# Patient Record
Sex: FEMALE | Race: WHITE | Employment: OTHER | URBAN - METROPOLITAN AREA
[De-identification: names, ages, dates, MRNs, and addresses within clinical notes are randomized per-mention and may not be internally consistent; named-entity substitution may affect disease eponyms.]

---

## 2017-04-04 ENCOUNTER — OFFICE VISIT (OUTPATIENT)
Dept: FAMILY MEDICINE CLINIC | Facility: CLINIC | Age: 47
End: 2017-04-04

## 2017-04-04 VITALS
HEIGHT: 64 IN | TEMPERATURE: 99 F | BODY MASS INDEX: 27.49 KG/M2 | OXYGEN SATURATION: 98 % | HEART RATE: 74 BPM | DIASTOLIC BLOOD PRESSURE: 84 MMHG | RESPIRATION RATE: 18 BRPM | WEIGHT: 161 LBS | SYSTOLIC BLOOD PRESSURE: 108 MMHG

## 2017-04-04 DIAGNOSIS — J02.9 SORE THROAT: Primary | ICD-10-CM

## 2017-04-04 DIAGNOSIS — J01.40 ACUTE PANSINUSITIS, RECURRENCE NOT SPECIFIED: ICD-10-CM

## 2017-04-04 PROCEDURE — 99213 OFFICE O/P EST LOW 20 MIN: CPT | Performed by: PHYSICIAN ASSISTANT

## 2017-04-04 PROCEDURE — 87880 STREP A ASSAY W/OPTIC: CPT | Performed by: PHYSICIAN ASSISTANT

## 2017-04-04 RX ORDER — SWAB
2000 SWAB, NON-MEDICATED MISCELLANEOUS DAILY
COMMUNITY
End: 2017-08-31

## 2017-04-04 RX ORDER — AMOXICILLIN AND CLAVULANATE POTASSIUM 875; 125 MG/1; MG/1
1 TABLET, FILM COATED ORAL 2 TIMES DAILY
Qty: 20 TABLET | Refills: 0 | Status: SHIPPED | OUTPATIENT
Start: 2017-04-04 | End: 2017-04-14

## 2017-04-04 NOTE — PATIENT INSTRUCTIONS
Self-Care for Sinusitis     Drinking plenty of water can help sinuses drain. Sinusitis can often be managed with self-care. Self-care can keep sinuses moist and make you feel more comfortable. Remember to follow your doctor's instructions closely.  This Colds, flu, and allergies make it more likely for you to get sinusitis. Do your best to prevent sinusitis by preventing these problems. Do what you can to avoid getting colds and other infections. Stay away from things that cause allergies (allergens).  Yoli Lazar · Stay away from all types of smoke, which dries out sinus linings. This includes tobacco smoke and chemical smoke in workplace settings. · Use saltwater rinses.   Date Last Reviewed: 10/1/2016  © 2182-7978 The [address] Prevention tips include the following:  · Stop smoking or reduce contact with secondhand smoke. Smoke irritates the tender throat lining. · Limit contact with pets and with allergy-causing substances, such as pollen and mold.   · When you’re around someone

## 2017-04-04 NOTE — PROGRESS NOTES
CHIEF COMPLAINT:   Patient presents with:  Sore Throat    HPI:   Agustín Cornejo is a 55year old female who presents for sinus congestion, PND, and sore throat for  4  days. Symptoms have been worsening since onset.  Sinus congestion/pain is described as a p /84 mmHg  Pulse 74  Temp(Src) 98.8 °F (37.1 °C) (Oral)  Resp 18  Ht 64\"  Wt 161 lb  BMI 27.62 kg/m2  SpO2 98%  GENERAL: well developed, well nourished, and in no apparent distress  SKIN: no rashes, no suspicious lesions  HEAD: atraumatic, normocepha Drink fluids  Drinking extra fluids helps thin your mucus. This lets it drain from your sinuses more easily. Have a glass of water every hour or two. A humidifier helps in much the same way. Fluids can also offset the drying effects of certain medicines.  I When traveling on an airplane, use saline nasal spray to keep your sinuses moist. Drink plenty of fluids. You may also want to take a decongestant before you get on the plane. Prevent colds  Do what you can to avoid being exposed to colds and flu.  When p © 5856-4305 The [address] [place] [address], [address] [place]. All rights reserved. This information is not intended as a substitute for professional medical care. Always follow your healthcare professional's instructions.         Self-Ca · Limit contact with pets and with allergy-causing substances, such as pollen and mold. · When you’re around someone with a sore throat or cold, wash your hands often to keep viruses or bacteria from spreading. · Don’t strain your vocal cords.   Call your

## 2017-08-31 ENCOUNTER — LAB ENCOUNTER (OUTPATIENT)
Dept: LAB | Age: 47
End: 2017-08-31
Attending: INTERNAL MEDICINE
Payer: COMMERCIAL

## 2017-08-31 ENCOUNTER — OFFICE VISIT (OUTPATIENT)
Dept: FAMILY MEDICINE CLINIC | Facility: CLINIC | Age: 47
End: 2017-08-31

## 2017-08-31 VITALS
DIASTOLIC BLOOD PRESSURE: 78 MMHG | BODY MASS INDEX: 27.66 KG/M2 | HEIGHT: 64 IN | SYSTOLIC BLOOD PRESSURE: 122 MMHG | RESPIRATION RATE: 16 BRPM | WEIGHT: 162 LBS | TEMPERATURE: 98 F | HEART RATE: 64 BPM

## 2017-08-31 DIAGNOSIS — M54.2 CHRONIC NECK PAIN: ICD-10-CM

## 2017-08-31 DIAGNOSIS — R07.89 CHEST HEAVINESS: ICD-10-CM

## 2017-08-31 DIAGNOSIS — G89.29 CHRONIC NECK PAIN: ICD-10-CM

## 2017-08-31 DIAGNOSIS — Z00.00 LABORATORY EXAMINATION ORDERED AS PART OF A ROUTINE GENERAL MEDICAL EXAMINATION: ICD-10-CM

## 2017-08-31 DIAGNOSIS — R13.10 DYSPHAGIA, UNSPECIFIED TYPE: ICD-10-CM

## 2017-08-31 DIAGNOSIS — E55.9 HYPOVITAMINOSIS D: ICD-10-CM

## 2017-08-31 DIAGNOSIS — Z12.31 ENCOUNTER FOR SCREENING MAMMOGRAM FOR MALIGNANT NEOPLASM OF BREAST: ICD-10-CM

## 2017-08-31 DIAGNOSIS — Z12.4 CERVICAL CANCER SCREENING: ICD-10-CM

## 2017-08-31 DIAGNOSIS — Z00.00 WELLNESS EXAMINATION: Primary | ICD-10-CM

## 2017-08-31 LAB
25-HYDROXYVITAMIN D (TOTAL): 47.6 NG/ML (ref 30–100)
ALBUMIN SERPL-MCNC: 4 G/DL (ref 3.5–4.8)
ALP LIVER SERPL-CCNC: 73 U/L (ref 39–100)
ALT SERPL-CCNC: 46 U/L (ref 14–54)
AST SERPL-CCNC: 30 U/L (ref 15–41)
BASOPHILS # BLD AUTO: 0.03 X10(3) UL (ref 0–0.1)
BASOPHILS NFR BLD AUTO: 0.4 %
BILIRUB SERPL-MCNC: 0.4 MG/DL (ref 0.1–2)
BUN BLD-MCNC: 10 MG/DL (ref 8–20)
CALCIUM BLD-MCNC: 9.5 MG/DL (ref 8.3–10.3)
CHLORIDE: 106 MMOL/L (ref 101–111)
CHOLEST SMN-MCNC: 226 MG/DL (ref ?–200)
CO2: 27 MMOL/L (ref 22–32)
CREAT BLD-MCNC: 0.66 MG/DL (ref 0.55–1.02)
EOSINOPHIL # BLD AUTO: 0.35 X10(3) UL (ref 0–0.3)
EOSINOPHIL NFR BLD AUTO: 5.2 %
ERYTHROCYTE [DISTWIDTH] IN BLOOD BY AUTOMATED COUNT: 13.6 % (ref 11.5–16)
GLUCOSE BLD-MCNC: 78 MG/DL (ref 70–99)
HCT VFR BLD AUTO: 41 % (ref 34–50)
HDLC SERPL-MCNC: 69 MG/DL (ref 45–?)
HDLC SERPL: 3.28 {RATIO} (ref ?–4.44)
HGB BLD-MCNC: 12.9 G/DL (ref 12–16)
IMMATURE GRANULOCYTE COUNT: 0.02 X10(3) UL (ref 0–1)
IMMATURE GRANULOCYTE RATIO %: 0.3 %
LDLC SERPL CALC-MCNC: 134 MG/DL (ref ?–130)
LDLC SERPL-MCNC: 23 MG/DL (ref 5–40)
LYMPHOCYTES # BLD AUTO: 1.59 X10(3) UL (ref 0.9–4)
LYMPHOCYTES NFR BLD AUTO: 23.8 %
M PROTEIN MFR SERPL ELPH: 7.9 G/DL (ref 6.1–8.3)
MCH RBC QN AUTO: 32.5 PG (ref 27–33.2)
MCHC RBC AUTO-ENTMCNC: 31.5 G/DL (ref 31–37)
MCV RBC AUTO: 103.3 FL (ref 81–100)
MONOCYTES # BLD AUTO: 0.49 X10(3) UL (ref 0.1–0.6)
MONOCYTES NFR BLD AUTO: 7.3 %
NEUTROPHIL ABS PRELIM: 4.2 X10 (3) UL (ref 1.3–6.7)
NEUTROPHILS # BLD AUTO: 4.2 X10(3) UL (ref 1.3–6.7)
NEUTROPHILS NFR BLD AUTO: 63 %
NONHDLC SERPL-MCNC: 157 MG/DL (ref ?–130)
PLATELET # BLD AUTO: 219 10(3)UL (ref 150–450)
POTASSIUM SERPL-SCNC: 4.9 MMOL/L (ref 3.6–5.1)
RBC # BLD AUTO: 3.97 X10(6)UL (ref 3.8–5.1)
RED CELL DISTRIBUTION WIDTH-SD: 51.8 FL (ref 35.1–46.3)
SODIUM SERPL-SCNC: 141 MMOL/L (ref 136–144)
TRIGLYCERIDES: 115 MG/DL (ref ?–150)
TSI SER-ACNC: 1.64 MIU/ML (ref 0.35–5.5)
WBC # BLD AUTO: 6.7 X10(3) UL (ref 4–13)

## 2017-08-31 PROCEDURE — 82306 VITAMIN D 25 HYDROXY: CPT | Performed by: INTERNAL MEDICINE

## 2017-08-31 PROCEDURE — 99396 PREV VISIT EST AGE 40-64: CPT | Performed by: INTERNAL MEDICINE

## 2017-08-31 PROCEDURE — 93000 ELECTROCARDIOGRAM COMPLETE: CPT | Performed by: INTERNAL MEDICINE

## 2017-08-31 PROCEDURE — 80061 LIPID PANEL: CPT | Performed by: INTERNAL MEDICINE

## 2017-08-31 PROCEDURE — 80050 GENERAL HEALTH PANEL: CPT | Performed by: INTERNAL MEDICINE

## 2017-08-31 PROCEDURE — 36415 COLL VENOUS BLD VENIPUNCTURE: CPT | Performed by: INTERNAL MEDICINE

## 2017-08-31 RX ORDER — MULTIVIT-MIN/IRON/FOLIC ACID/K 18-600-40
1 CAPSULE ORAL DAILY
COMMUNITY

## 2017-08-31 RX ORDER — MULTIVITAMIN WITH IRON
1 TABLET ORAL DAILY
COMMUNITY
Start: 2015-07-01 | End: 2021-02-02 | Stop reason: ALTCHOICE

## 2017-08-31 RX ORDER — FLUOXETINE HYDROCHLORIDE 20 MG/1
20 CAPSULE ORAL DAILY
Qty: 90 CAPSULE | Refills: 3 | Status: SHIPPED | OUTPATIENT
Start: 2017-08-31 | End: 2018-08-03

## 2017-08-31 NOTE — PATIENT INSTRUCTIONS
Thank you for choosing Ced Calvert MD at [place] [address]  To Do: Gabby Meeks  1. Blood work  2. Call gastroenterology to schedule appointment  3. Call to schedule barium esophagram (schedule to be done prior to gastroenterology appointment)  4.  Ca entire package insert of all medicines prescribed to you and be aware of all of the risks of treatment even beyond those discussed today.  All therapies have potential risk of harm or side effects or medication interactions.  It is your duty and for your s

## 2017-08-31 NOTE — PROGRESS NOTES
[facility] Internal Medicine Office Note  Chief Complaint:   Patient presents with:  Wellness Visit      HPI:   This is a 52year old female coming in for physical   HPI    HM: due for pap - requests new gynecologist   mammo - due    Anxiety - re Comment:Has tolerated Zpack  Macrobid [Nitrofura*      [facility] R*        Current Outpatient Prescriptions:  magnesium 250 MG Oral Tab Take 1 tablet by mouth daily.  Disp:  Rfl:    Cholecalciferol (VITAMIN D) 2000 units Oral Cap Take 1 capsule She has no cervical adenopathy. Neurological: She is alert. Skin: No rash noted. No erythema. Psychiatric: She has a normal mood and affect.        ASSESSMENT AND PLAN:   Debbie Cash is a 52year old female with  Wellness examination  (primary encou Vitamin D, 25-Hydroxy [E]    Meds & Refills for this Visit:  Signed Prescriptions Disp Refills    FLUoxetine HCl 20 MG Oral Cap 90 capsule 3      Sig: Take 1 capsule (20 mg total) by mouth daily.            Imaging & Consults:  OBG - INTERNAL  GASTRO - IN

## 2017-09-01 ENCOUNTER — TELEPHONE (OUTPATIENT)
Dept: FAMILY MEDICINE CLINIC | Facility: CLINIC | Age: 47
End: 2017-09-01

## 2017-09-01 DIAGNOSIS — M54.2 CHRONIC NECK PAIN: Primary | ICD-10-CM

## 2017-09-01 DIAGNOSIS — G89.29 CHRONIC NECK PAIN: Primary | ICD-10-CM

## 2017-09-07 ENCOUNTER — HOSPITAL ENCOUNTER (OUTPATIENT)
Dept: MAMMOGRAPHY | Age: 47
Discharge: HOME | End: 2017-09-07
Attending: INTERNAL MEDICINE
Payer: COMMERCIAL

## 2017-09-07 ENCOUNTER — HOSPITAL ENCOUNTER (OUTPATIENT)
Dept: GENERAL RADIOLOGY | Age: 47
Discharge: HOME | End: 2017-09-07
Attending: INTERNAL MEDICINE
Payer: COMMERCIAL

## 2017-09-07 DIAGNOSIS — Z12.31 ENCOUNTER FOR SCREENING MAMMOGRAM FOR MALIGNANT NEOPLASM OF BREAST: ICD-10-CM

## 2017-09-07 DIAGNOSIS — R13.10 DYSPHAGIA, UNSPECIFIED TYPE: ICD-10-CM

## 2017-09-07 PROCEDURE — 74246 X-RAY XM UPR GI TRC 2CNTRST: CPT | Performed by: INTERNAL MEDICINE

## 2017-09-07 PROCEDURE — 77067 SCR MAMMO BI INCL CAD: CPT | Performed by: INTERNAL MEDICINE

## 2017-09-18 ENCOUNTER — TELEPHONE (OUTPATIENT)
Dept: FAMILY MEDICINE CLINIC | Facility: CLINIC | Age: 47
End: 2017-09-18

## 2017-10-09 ENCOUNTER — OFFICE VISIT (OUTPATIENT)
Dept: OBGYN CLINIC | Facility: CLINIC | Age: 47
End: 2017-10-09

## 2017-10-09 VITALS
BODY MASS INDEX: 28 KG/M2 | HEART RATE: 80 BPM | SYSTOLIC BLOOD PRESSURE: 118 MMHG | WEIGHT: 164 LBS | DIASTOLIC BLOOD PRESSURE: 70 MMHG | HEIGHT: 64 IN

## 2017-10-09 DIAGNOSIS — Z12.39 BREAST CANCER SCREENING: ICD-10-CM

## 2017-10-09 DIAGNOSIS — Z01.419 WELL WOMAN EXAM: Primary | ICD-10-CM

## 2017-10-09 DIAGNOSIS — Z12.4 CERVICAL CANCER SCREENING: ICD-10-CM

## 2017-10-09 PROCEDURE — 87624 HPV HI-RISK TYP POOLED RSLT: CPT | Performed by: OBSTETRICS & GYNECOLOGY

## 2017-10-09 PROCEDURE — 99386 PREV VISIT NEW AGE 40-64: CPT | Performed by: OBSTETRICS & GYNECOLOGY

## 2017-10-09 PROCEDURE — 88175 CYTOPATH C/V AUTO FLUID REDO: CPT | Performed by: OBSTETRICS & GYNECOLOGY

## 2017-10-09 RX ORDER — MELATONIN
1000 DAILY
COMMUNITY
Start: 2017-09-01 | End: 2019-10-22 | Stop reason: ALTCHOICE

## 2017-10-09 NOTE — PROGRESS NOTES
GYN H&P     10/9/2017  2:08 PM    CC: Patient presents with:  Physical: PT here establish care and annual       HPI: patient is a 52year old  here for her annual gyne exam.      No menses for one year. Pt having hot flashes.    no pelvic pain  no va Watson Encarnacion Father    • Thyroid Disorder Mother    • Clotting Disorder Paternal Grandmother       of blood clot after orthopedic surgery, was elderly   • Clotting Disorder Paternal Aunt      DVT       Social History  Social History   Marital status: Trigg Nipper nipple discharge, no skin changes, no axillary adenopathy  ABDOMEN: Soft, non distended; non tender, no masses  GYNE/:                        External Genitalia: Normal appearing, no lesions.            Bladder: well supported, urethra wnl, no lesions

## 2017-11-29 ENCOUNTER — APPOINTMENT (OUTPATIENT)
Dept: LAB | Age: 47
End: 2017-11-29
Attending: INTERNAL MEDICINE
Payer: COMMERCIAL

## 2017-11-29 DIAGNOSIS — A04.8 H. PYLORI INFECTION: ICD-10-CM

## 2017-11-29 PROCEDURE — 83013 H PYLORI (C-13) BREATH: CPT

## 2017-12-24 ENCOUNTER — HOSPITAL ENCOUNTER (EMERGENCY)
Age: 47
Discharge: HOME | End: 2017-12-24
Attending: EMERGENCY MEDICINE
Payer: COMMERCIAL

## 2017-12-24 ENCOUNTER — APPOINTMENT (OUTPATIENT)
Dept: CT IMAGING | Age: 47
End: 2017-12-24
Attending: PHYSICIAN ASSISTANT
Payer: COMMERCIAL

## 2017-12-24 VITALS
HEIGHT: 64 IN | DIASTOLIC BLOOD PRESSURE: 96 MMHG | WEIGHT: 158 LBS | OXYGEN SATURATION: 100 % | RESPIRATION RATE: 16 BRPM | SYSTOLIC BLOOD PRESSURE: 133 MMHG | TEMPERATURE: 98 F | HEART RATE: 71 BPM | BODY MASS INDEX: 26.98 KG/M2
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
## 2017-12-24 DIAGNOSIS — S02.92XA MULTIPLE CLOSED FRACTURES OF FACIAL BONE, INITIAL ENCOUNTER (HCC): ICD-10-CM

## 2017-12-24 PROCEDURE — 99285 EMERGENCY DEPT VISIT HI MDM: CPT

## 2017-12-24 PROCEDURE — 99284 EMERGENCY DEPT VISIT MOD MDM: CPT

## 2017-12-24 PROCEDURE — 70486 CT MAXILLOFACIAL W/O DYE: CPT | Performed by: PHYSICIAN ASSISTANT

## 2017-12-24 NOTE — ED INITIAL ASSESSMENT (HPI)
Rito Ice onto face while dancing 2 days ago - denies loc - c/o increased swelling to face also c/o pain to right forearm

## 2017-12-24 NOTE — ED PROVIDER NOTES
I reviewed that chart and discussed the case. I have examined the patient and noted patient was dancing and fell after tripping. She fell onto her right side of her face. No loss of consciousness.   The patient states the pain is still present no double arch, anterior and posterior lateral maxillary sinus walls, with trace blood in the right maxillary sinus. There is also a nondepressed right lateral orbital wall fracture.    Dictated by: Radha Srivastava MD on 12/24/2017 at 13:17     Approved by: Claudetta Salm

## 2017-12-24 NOTE — ED PROVIDER NOTES
Patient Seen in: THE [facility] In [place]    History   Patient presents with:  Pain (neurologic)    Stated Complaint: fell 2days ago - pain to face     HPI    CHIEF COMPLAINT: Right-sided facial pain and swelling     HISTORY OF PRESENT I Never Used                      Alcohol use: Yes              Comment: once a week      Review of Systems    Positive for stated complaint: fell 2days ago - pain to face   Other systems are as noted in HPI. Constitutional and vital signs reviewed.       Al motion  Psychiatric: calm and cooperative  Right upper extremity:     ED Course   Labs Reviewed - No data to display    Ct Facial Bones (cpt=70486)    Result Date: 12/24/2017  PROCEDURE:  CT FACIAL BONES (CPT=70486)  COMPARISON:  [place], CT ANGIOGRAPHY of ENT. He recommends the patient follow up with him in office in 2 weeks. Patient does not need an antibiotic at this time. She can take Tylenol or ibuprofen as needed for pain.     MDM     70-year-old female with multiple facial bone fractures that she

## 2018-02-19 ENCOUNTER — HOSPITAL ENCOUNTER (OUTPATIENT)
Dept: GENERAL RADIOLOGY | Age: 48
Discharge: HOME | End: 2018-02-19
Attending: INTERNAL MEDICINE
Payer: COMMERCIAL

## 2018-02-19 DIAGNOSIS — M54.2 NECK PAIN: ICD-10-CM

## 2018-02-19 PROCEDURE — 72040 X-RAY EXAM NECK SPINE 2-3 VW: CPT | Performed by: INTERNAL MEDICINE

## 2018-05-08 ENCOUNTER — OFFICE VISIT (OUTPATIENT)
Dept: FAMILY MEDICINE CLINIC | Facility: CLINIC | Age: 48
End: 2018-05-08

## 2018-05-08 VITALS
SYSTOLIC BLOOD PRESSURE: 108 MMHG | DIASTOLIC BLOOD PRESSURE: 60 MMHG | TEMPERATURE: 99 F | OXYGEN SATURATION: 99 % | WEIGHT: 164 LBS | BODY MASS INDEX: 28 KG/M2 | HEART RATE: 63 BPM | RESPIRATION RATE: 18 BRPM

## 2018-05-08 DIAGNOSIS — J02.9 SORE THROAT: Primary | ICD-10-CM

## 2018-05-08 DIAGNOSIS — H69.82 EUSTACHIAN TUBE DYSFUNCTION, LEFT: ICD-10-CM

## 2018-05-08 PROCEDURE — 99213 OFFICE O/P EST LOW 20 MIN: CPT | Performed by: NURSE PRACTITIONER

## 2018-05-08 PROCEDURE — 87880 STREP A ASSAY W/OPTIC: CPT | Performed by: NURSE PRACTITIONER

## 2018-05-08 NOTE — PROGRESS NOTES
CHIEF COMPLAINT:   Patient presents with:  Ear Pain: pt c\o of left ear pain, x 9 day       HPI:   Kyle Vogel is a 52year old female who presents to clinic today with complaints of L ear pain. Has had for 9  days.  Denies drainage from ear and itchi REVIEW OF SYSTEMS:   GENERAL: Feeling well otherwise. SKIN: no unusual skin lesions or rashes  HEENT: See HPI  LUNGS: No cough, shortness of breath, or wheezing. CARDIOVASCULAR: No chest pain, palpitations  GI: No N/V/C/D. No abdominal pain.   NEURO: de No prescriptions requested or ordered in this encounter      Risk and benefits of medication discussed. Stressed importance of completing full course of antibiotic. Tylenol/Motrin prn pain.       Call or return if s/sx worsen, do not improve in 3 days, Because the middle ear fluid can become infected, it is important to watch for signs of an ear infection which may develop later. These signs include increased ear pain, fever, or drainage from the ear.   Home care  The following guidelines will help you ca You have been diagnosed with temporomandibular disorder (TMD). This term describes a group of problems related to the temporomandibular joint (TMJ) and nearby muscles. The TMJ is located where the upper and lower jaws meet.  TMD can cause painful and frustr · Massage, both inside and outside the mouth. This relaxes muscles and improves circulation. · Palpation, which is applying pressure to points of the jaw and face with the fingers. · Cold spray and stretching of the muscles to relax them.   · An anestheti Patient voiced understand and is in agreement with treatment plan.

## 2018-05-08 NOTE — PATIENT INSTRUCTIONS
-take allegra daily  -take ibuprofen 600mg every 6-8 hours for the next couple of days  -follow up with dentistry if continued jaw pain      Earache, No Infection (Adult)  Earaches can happen without an infection.  This occurs when air and fluid build up be · You may use over-the-counter medicine as directed to control pain, unless another medicine was prescribed. If you have chronic liver or kidney disease or ever had a stomach ulcer or GI bleeding, talk with your doctor before using these medicines.  Aspirin You have been diagnosed with temporomandibular disorder (TMD). This term describes a group of problems related to the temporomandibular joint (TMJ) and nearby muscles. The TMJ is located where the upper and lower jaws meet.  TMD can cause painful and frustr · Massage, both inside and outside the mouth. This relaxes muscles and improves circulation. · Palpation, which is applying pressure to points of the jaw and face with the fingers. · Cold spray and stretching of the muscles to relax them.   · An anestheti

## 2018-08-03 RX ORDER — FLUOXETINE HYDROCHLORIDE 20 MG/1
CAPSULE ORAL
Qty: 90 CAPSULE | OUTPATIENT
Start: 2018-08-03

## 2018-08-03 RX ORDER — FLUOXETINE HYDROCHLORIDE 20 MG/1
20 CAPSULE ORAL DAILY
Qty: 90 CAPSULE | Refills: 0 | Status: SHIPPED | OUTPATIENT
Start: 2018-08-03 | End: 2018-10-11

## 2018-10-11 ENCOUNTER — HOSPITAL ENCOUNTER (OUTPATIENT)
Dept: MAMMOGRAPHY | Age: 48
Discharge: HOME | End: 2018-10-11
Attending: INTERNAL MEDICINE
Payer: COMMERCIAL

## 2018-10-11 ENCOUNTER — OFFICE VISIT (OUTPATIENT)
Dept: INTERNAL MEDICINE CLINIC | Facility: CLINIC | Age: 48
End: 2018-10-11
Payer: COMMERCIAL

## 2018-10-11 ENCOUNTER — LAB ENCOUNTER (OUTPATIENT)
Dept: LAB | Age: 48
End: 2018-10-11
Attending: INTERNAL MEDICINE
Payer: COMMERCIAL

## 2018-10-11 VITALS
BODY MASS INDEX: 27.83 KG/M2 | HEART RATE: 74 BPM | DIASTOLIC BLOOD PRESSURE: 80 MMHG | TEMPERATURE: 99 F | WEIGHT: 163 LBS | OXYGEN SATURATION: 98 % | SYSTOLIC BLOOD PRESSURE: 122 MMHG | HEIGHT: 64 IN | RESPIRATION RATE: 14 BRPM

## 2018-10-11 DIAGNOSIS — E55.9 HYPOVITAMINOSIS D: ICD-10-CM

## 2018-10-11 DIAGNOSIS — F41.9 ANXIETY: ICD-10-CM

## 2018-10-11 DIAGNOSIS — Z00.00 WELLNESS EXAMINATION: Primary | ICD-10-CM

## 2018-10-11 DIAGNOSIS — R53.83 FATIGUE, UNSPECIFIED TYPE: ICD-10-CM

## 2018-10-11 DIAGNOSIS — E53.8 LOW VITAMIN B12 LEVEL: ICD-10-CM

## 2018-10-11 DIAGNOSIS — Z12.39 SCREENING FOR BREAST CANCER: ICD-10-CM

## 2018-10-11 DIAGNOSIS — Z12.31 SCREENING MAMMOGRAM, ENCOUNTER FOR: ICD-10-CM

## 2018-10-11 DIAGNOSIS — L02.91 ABSCESS: ICD-10-CM

## 2018-10-11 DIAGNOSIS — Z00.00 LABORATORY EXAM ORDERED AS PART OF ROUTINE GENERAL MEDICAL EXAMINATION: ICD-10-CM

## 2018-10-11 PROCEDURE — 84439 ASSAY OF FREE THYROXINE: CPT | Performed by: INTERNAL MEDICINE

## 2018-10-11 PROCEDURE — 99396 PREV VISIT EST AGE 40-64: CPT | Performed by: INTERNAL MEDICINE

## 2018-10-11 PROCEDURE — 90686 IIV4 VACC NO PRSV 0.5 ML IM: CPT | Performed by: INTERNAL MEDICINE

## 2018-10-11 PROCEDURE — 77063 BREAST TOMOSYNTHESIS BI: CPT | Performed by: INTERNAL MEDICINE

## 2018-10-11 PROCEDURE — 80050 GENERAL HEALTH PANEL: CPT | Performed by: INTERNAL MEDICINE

## 2018-10-11 PROCEDURE — 36415 COLL VENOUS BLD VENIPUNCTURE: CPT | Performed by: INTERNAL MEDICINE

## 2018-10-11 PROCEDURE — 77067 SCR MAMMO BI INCL CAD: CPT | Performed by: INTERNAL MEDICINE

## 2018-10-11 PROCEDURE — 80061 LIPID PANEL: CPT | Performed by: INTERNAL MEDICINE

## 2018-10-11 PROCEDURE — 90471 IMMUNIZATION ADMIN: CPT | Performed by: INTERNAL MEDICINE

## 2018-10-11 PROCEDURE — 82306 VITAMIN D 25 HYDROXY: CPT | Performed by: INTERNAL MEDICINE

## 2018-10-11 PROCEDURE — 82607 VITAMIN B-12: CPT | Performed by: INTERNAL MEDICINE

## 2018-10-11 RX ORDER — CYCLOBENZAPRINE HCL 10 MG
TABLET ORAL
COMMUNITY
Start: 2018-06-18

## 2018-10-11 RX ORDER — CEPHALEXIN 500 MG/1
500 CAPSULE ORAL 4 TIMES DAILY
Qty: 28 CAPSULE | Refills: 0 | Status: SHIPPED | OUTPATIENT
Start: 2018-10-11 | End: 2019-10-22 | Stop reason: ALTCHOICE

## 2018-10-11 RX ORDER — FLUOXETINE HYDROCHLORIDE 20 MG/1
20 CAPSULE ORAL DAILY
Qty: 90 CAPSULE | Refills: 3 | Status: SHIPPED | OUTPATIENT
Start: 2018-10-11 | End: 2018-12-22

## 2018-10-11 NOTE — PROGRESS NOTES
[facility] Internal Medicine Office Note  Chief Complaint:   Patient presents with:  CPX      HPI:   This is a 50year old female coming in for physical   HPI  Since last year, she had a fall and fractured her face and still has some numbness in R*        Current Outpatient Medications:  Cyclobenzaprine HCl 10 MG Oral Tab Take by mouth. Disp:  Rfl:    FLUoxetine HCl 20 MG Oral Cap Take 1 capsule (20 mg total) by mouth daily.  Disp: 90 capsule Rfl: 3   cephALEXin 500 MG Oral Cap Take 1 capsule (500 Normal rate, regular rhythm and normal heart sounds. Pulmonary/Chest: Effort normal and breath sounds normal.   Abdominal: Soft. There is no tenderness. Lymphadenopathy:     She has no cervical adenopathy. Neurological: She is alert.    Skin:   Has 2 MG Oral Cap; Take 1 capsule (500 mg total) by mouth 4 (four) times daily.         Orders Placed This Encounter      Vitamin D, 25-Hydroxy [E]      TSH and Free T4 [E]      CBC W Differential W Platelet [E]      Comp Metabolic Panel (14) [E]      Lipid Panel

## 2018-10-12 ENCOUNTER — TELEPHONE (OUTPATIENT)
Dept: INTERNAL MEDICINE CLINIC | Facility: CLINIC | Age: 48
End: 2018-10-12

## 2018-10-12 DIAGNOSIS — E78.5 DYSLIPIDEMIA: ICD-10-CM

## 2018-10-12 DIAGNOSIS — R79.89 LOW SERUM T4 LEVEL: Primary | ICD-10-CM

## 2018-10-12 RX ORDER — LEVOTHYROXINE SODIUM 0.03 MG/1
TABLET ORAL
Qty: 30 TABLET | Refills: 3 | Status: SHIPPED | OUTPATIENT
Start: 2018-10-12 | End: 2018-12-10

## 2018-10-12 NOTE — TELEPHONE ENCOUNTER
Per Dr. Rubin Channel request OV with Dr. Sylwia Bustillos cancelled d/t abscess spontaneously opening and nearly resolved. Cancelled OV with Dr. Sylwia Bustillos.

## 2018-11-29 ENCOUNTER — PATIENT MESSAGE (OUTPATIENT)
Dept: INTERNAL MEDICINE CLINIC | Facility: CLINIC | Age: 48
End: 2018-11-29

## 2018-12-10 NOTE — TELEPHONE ENCOUNTER
Last office visit 10/11/18 with Dr. Josh Silverman. Last refill 10/12/18 (#30, 3 refills). Requesting 90-day supply.

## 2018-12-13 RX ORDER — LEVOTHYROXINE SODIUM 0.03 MG/1
TABLET ORAL
Qty: 90 TABLET | Refills: 0 | Status: SHIPPED | OUTPATIENT
Start: 2018-12-13 | End: 2018-12-22

## 2018-12-17 ENCOUNTER — APPOINTMENT (OUTPATIENT)
Dept: LAB | Age: 48
End: 2018-12-17
Attending: INTERNAL MEDICINE
Payer: COMMERCIAL

## 2018-12-17 DIAGNOSIS — R79.89 LOW SERUM T4 LEVEL: ICD-10-CM

## 2018-12-17 DIAGNOSIS — E78.5 DYSLIPIDEMIA: ICD-10-CM

## 2018-12-17 PROCEDURE — 36415 COLL VENOUS BLD VENIPUNCTURE: CPT | Performed by: INTERNAL MEDICINE

## 2018-12-17 PROCEDURE — 80061 LIPID PANEL: CPT | Performed by: INTERNAL MEDICINE

## 2018-12-17 PROCEDURE — 84443 ASSAY THYROID STIM HORMONE: CPT | Performed by: INTERNAL MEDICINE

## 2018-12-17 PROCEDURE — 84439 ASSAY OF FREE THYROXINE: CPT | Performed by: INTERNAL MEDICINE

## 2018-12-22 DIAGNOSIS — F41.9 ANXIETY: ICD-10-CM

## 2018-12-28 RX ORDER — FLUOXETINE HYDROCHLORIDE 20 MG/1
20 CAPSULE ORAL DAILY
Qty: 90 CAPSULE | Refills: 1 | Status: SHIPPED | OUTPATIENT
Start: 2018-12-28 | End: 2019-06-03

## 2018-12-28 RX ORDER — LEVOTHYROXINE SODIUM 0.03 MG/1
TABLET ORAL
Qty: 90 TABLET | Refills: 1 | Status: SHIPPED | OUTPATIENT
Start: 2018-12-28 | End: 2019-03-21

## 2019-03-11 RX ORDER — LEVOTHYROXINE SODIUM 0.03 MG/1
TABLET ORAL
Qty: 90 TABLET | Refills: 0 | Status: SHIPPED | OUTPATIENT
Start: 2019-03-11 | End: 2019-11-03

## 2019-03-11 NOTE — TELEPHONE ENCOUNTER
Refill requested:   Requested Prescriptions     Pending Prescriptions Disp Refills   • Levothyroxine Sodium 25 MCG Oral Tab [Pharmacy Med Name: LEVOTHYROXINE 25 MCG TABLET] 90 tablet 0     Sig: TAKE 1 TABLET BY MOUTH ONE HOUR PRIOR TO BREAKFAST AND OTHER M

## 2019-03-15 RX ORDER — LEVOTHYROXINE SODIUM 0.03 MG/1
TABLET ORAL
Qty: 90 TABLET | Refills: 0 | OUTPATIENT
Start: 2019-03-15

## 2019-03-18 NOTE — TELEPHONE ENCOUNTER
Patient had medication authorized for: Levothyroxine Sodium 25 MCG Oral Tab    Needs 90 say supply to be sent to [facility] [address]  [address] [place] [phone], [phone]

## 2019-03-21 RX ORDER — LEVOTHYROXINE SODIUM 0.03 MG/1
TABLET ORAL
Qty: 90 TABLET | Refills: 0 | Status: SHIPPED | OUTPATIENT
Start: 2019-03-21 | End: 2019-06-24

## 2019-06-03 DIAGNOSIS — F41.9 ANXIETY: ICD-10-CM

## 2019-06-04 RX ORDER — FLUOXETINE HYDROCHLORIDE 20 MG/1
CAPSULE ORAL
Qty: 90 CAPSULE | Refills: 0 | Status: SHIPPED | OUTPATIENT
Start: 2019-06-04 | End: 2019-08-14

## 2019-06-04 NOTE — TELEPHONE ENCOUNTER
Refill requested:   Requested Prescriptions     Pending Prescriptions Disp Refills   • FLUOXETINE HCL 20 MG Oral Cap [Pharmacy Med Name: FLUOXETINE  20MG  CAP] 90 capsule 1     Sig: TAKE 1 CAPSULE BY MOUTH  DAILY     Non protocol medication      Last refil

## 2019-06-24 DIAGNOSIS — R79.89 ABNORMAL THYROID BLOOD TEST: Primary | ICD-10-CM

## 2019-06-27 RX ORDER — LEVOTHYROXINE SODIUM 0.03 MG/1
TABLET ORAL
Qty: 90 TABLET | Refills: 0 | Status: SHIPPED | OUTPATIENT
Start: 2019-06-27 | End: 2019-09-06

## 2019-07-03 ENCOUNTER — PATIENT MESSAGE (OUTPATIENT)
Dept: INTERNAL MEDICINE CLINIC | Facility: CLINIC | Age: 49
End: 2019-07-03

## 2019-07-03 NOTE — TELEPHONE ENCOUNTER
From: Chi Young  To: Priscila Cole MD  Sent: 7/3/2019 2:34 PM CDT  Subject: Other    Dear Dr. Tika Ratliff,  I have been summoned for [employer] duty in [facility]. I recently sent them a medical history of why I felt I cannot sit on a jury.  They

## 2019-07-22 ENCOUNTER — LAB ENCOUNTER (OUTPATIENT)
Dept: LAB | Age: 49
End: 2019-07-22
Attending: INTERNAL MEDICINE
Payer: COMMERCIAL

## 2019-07-22 DIAGNOSIS — Z51.81 THERAPEUTIC DRUG MONITORING: ICD-10-CM

## 2019-07-22 DIAGNOSIS — R79.89 ABNORMAL THYROID BLOOD TEST: ICD-10-CM

## 2019-07-22 DIAGNOSIS — R53.82 CHRONIC FATIGUE: Primary | ICD-10-CM

## 2019-07-22 LAB
ALBUMIN SERPL-MCNC: 3.8 G/DL (ref 3.4–5)
ALBUMIN/GLOB SERPL: 1 {RATIO} (ref 1–2)
ALP LIVER SERPL-CCNC: 83 U/L (ref 39–100)
ALT SERPL-CCNC: 40 U/L (ref 13–56)
ANION GAP SERPL CALC-SCNC: 6 MMOL/L (ref 0–18)
AST SERPL-CCNC: 20 U/L (ref 15–37)
BASOPHILS # BLD AUTO: 0.03 X10(3) UL (ref 0–0.2)
BASOPHILS NFR BLD AUTO: 0.5 %
BILIRUB SERPL-MCNC: 0.4 MG/DL (ref 0.1–2)
BUN BLD-MCNC: 11 MG/DL (ref 7–18)
BUN/CREAT SERPL: 12.6 (ref 10–20)
CALCIUM BLD-MCNC: 9.1 MG/DL (ref 8.5–10.1)
CHLORIDE SERPL-SCNC: 108 MMOL/L (ref 98–112)
CO2 SERPL-SCNC: 27 MMOL/L (ref 21–32)
CREAT BLD-MCNC: 0.87 MG/DL (ref 0.55–1.02)
DEPRECATED RDW RBC AUTO: 49.7 FL (ref 35.1–46.3)
EOSINOPHIL # BLD AUTO: 0.2 X10(3) UL (ref 0–0.7)
EOSINOPHIL NFR BLD AUTO: 3.4 %
ERYTHROCYTE [DISTWIDTH] IN BLOOD BY AUTOMATED COUNT: 13.3 % (ref 11–15)
GLOBULIN PLAS-MCNC: 3.7 G/DL (ref 2.8–4.4)
GLUCOSE BLD-MCNC: 86 MG/DL (ref 70–99)
HCT VFR BLD AUTO: 40.4 % (ref 35–48)
HGB BLD-MCNC: 12.9 G/DL (ref 12–16)
IMM GRANULOCYTES # BLD AUTO: 0.01 X10(3) UL (ref 0–1)
IMM GRANULOCYTES NFR BLD: 0.2 %
LYMPHOCYTES # BLD AUTO: 1.83 X10(3) UL (ref 1–4)
LYMPHOCYTES NFR BLD AUTO: 30.7 %
M PROTEIN MFR SERPL ELPH: 7.5 G/DL (ref 6.4–8.2)
MCH RBC QN AUTO: 32.4 PG (ref 26–34)
MCHC RBC AUTO-ENTMCNC: 31.9 G/DL (ref 31–37)
MCV RBC AUTO: 101.5 FL (ref 80–100)
MONOCYTES # BLD AUTO: 0.49 X10(3) UL (ref 0.1–1)
MONOCYTES NFR BLD AUTO: 8.2 %
NEUTROPHILS # BLD AUTO: 3.41 X10 (3) UL (ref 1.5–7.7)
NEUTROPHILS # BLD AUTO: 3.41 X10(3) UL (ref 1.5–7.7)
NEUTROPHILS NFR BLD AUTO: 57 %
OSMOLALITY SERPL CALC.SUM OF ELEC: 291 MOSM/KG (ref 275–295)
PLATELET # BLD AUTO: 252 10(3)UL (ref 150–450)
POTASSIUM SERPL-SCNC: 4.2 MMOL/L (ref 3.5–5.1)
RBC # BLD AUTO: 3.98 X10(6)UL (ref 3.8–5.3)
SODIUM SERPL-SCNC: 141 MMOL/L (ref 136–145)
T4 FREE SERPL-MCNC: 0.8 NG/DL (ref 0.8–1.7)
TSI SER-ACNC: 0.88 MIU/ML (ref 0.36–3.74)
WBC # BLD AUTO: 6 X10(3) UL (ref 4–11)

## 2019-07-22 PROCEDURE — 84439 ASSAY OF FREE THYROXINE: CPT | Performed by: INTERNAL MEDICINE

## 2019-07-22 PROCEDURE — 36415 COLL VENOUS BLD VENIPUNCTURE: CPT | Performed by: INTERNAL MEDICINE

## 2019-07-22 PROCEDURE — 84443 ASSAY THYROID STIM HORMONE: CPT | Performed by: INTERNAL MEDICINE

## 2019-08-14 DIAGNOSIS — F41.9 ANXIETY: ICD-10-CM

## 2019-08-15 NOTE — TELEPHONE ENCOUNTER
FLUOXETINE HCL 20 MG     Last OV relevant to medication: 10/11/2018    Last refill date: 6/4/2019 # 90 caps with 0 refills     When pt was asked to return for OV: none noted     Upcoming appt/reason: none      Labs: 7/22/2019 ( cbc, cmp, and tsh )

## 2019-08-20 RX ORDER — FLUOXETINE HYDROCHLORIDE 20 MG/1
CAPSULE ORAL
Qty: 90 CAPSULE | Refills: 0 | Status: SHIPPED | OUTPATIENT
Start: 2019-08-20 | End: 2019-10-14

## 2019-09-09 RX ORDER — LEVOTHYROXINE SODIUM 0.03 MG/1
TABLET ORAL
Qty: 90 TABLET | Refills: 0 | Status: SHIPPED | OUTPATIENT
Start: 2019-09-09 | End: 2019-10-22

## 2019-09-09 NOTE — TELEPHONE ENCOUNTER
Levothyroxine Sodium 25 MCG Oral Tab    Passed Protocol    Last OV relevant to medication: 10/11/2018    Last refill date: 6/27/2019     #/refills: #90 w/ 0 refill    When pt was asked to return for OV: None Noted    Upcoming appt/reason: 10/22/2019    Lab

## 2019-10-14 DIAGNOSIS — F41.9 ANXIETY: ICD-10-CM

## 2019-10-16 NOTE — TELEPHONE ENCOUNTER
FLUOXETINE HCL 20 MG     Last OV relevant to medication: 10-     Last refill date: 8- # 90 capsules with 0 refills     When pt was asked to return for OV: none noted      Upcoming appt/reason: 10-     Labs: 7- # cbc, cmp, tsh

## 2019-10-17 RX ORDER — FLUOXETINE HYDROCHLORIDE 20 MG/1
CAPSULE ORAL
Qty: 90 CAPSULE | Refills: 1 | Status: SHIPPED | OUTPATIENT
Start: 2019-10-17 | End: 2021-02-02

## 2019-10-22 ENCOUNTER — OFFICE VISIT (OUTPATIENT)
Dept: INTERNAL MEDICINE CLINIC | Facility: CLINIC | Age: 49
End: 2019-10-22
Payer: COMMERCIAL

## 2019-10-22 VITALS
RESPIRATION RATE: 16 BRPM | HEART RATE: 84 BPM | DIASTOLIC BLOOD PRESSURE: 74 MMHG | BODY MASS INDEX: 28.09 KG/M2 | HEIGHT: 64 IN | TEMPERATURE: 98 F | SYSTOLIC BLOOD PRESSURE: 122 MMHG | WEIGHT: 164.5 LBS | OXYGEN SATURATION: 99 %

## 2019-10-22 DIAGNOSIS — Z12.31 ENCOUNTER FOR SCREENING MAMMOGRAM FOR MALIGNANT NEOPLASM OF BREAST: ICD-10-CM

## 2019-10-22 DIAGNOSIS — R79.89 ABNORMAL THYROID BLOOD TEST: ICD-10-CM

## 2019-10-22 DIAGNOSIS — E03.9 HYPOTHYROIDISM, UNSPECIFIED TYPE: ICD-10-CM

## 2019-10-22 DIAGNOSIS — Z00.00 LABORATORY EXAM ORDERED AS PART OF ROUTINE GENERAL MEDICAL EXAMINATION: ICD-10-CM

## 2019-10-22 DIAGNOSIS — Z00.00 ENCOUNTER FOR ROUTINE ADULT MEDICAL EXAMINATION: Primary | ICD-10-CM

## 2019-10-22 PROCEDURE — 99396 PREV VISIT EST AGE 40-64: CPT | Performed by: INTERNAL MEDICINE

## 2019-10-22 PROCEDURE — 90686 IIV4 VACC NO PRSV 0.5 ML IM: CPT | Performed by: INTERNAL MEDICINE

## 2019-10-22 PROCEDURE — 90471 IMMUNIZATION ADMIN: CPT | Performed by: INTERNAL MEDICINE

## 2019-10-22 NOTE — PROGRESS NOTES
[facility] Internal Medicine Office Note  Chief Complaint:   Patient presents with:  CPX      HPI:   This is a 52year old female coming in for physical  HPI  Also has cold symptoms  Started 5 days ago  No fever     She notes less rash, less brai Disp: , Rfl:           REVIEW OF SYSTEMS:   Review of Systems   Constitutional: Negative for fever. HENT: Negative for congestion. Eyes: Negative for visual disturbance. Respiratory: Negative for shortness of breath.     Cardiovascular: Negative for cpx.    Diagnoses and all orders for this visit:    Encounter for routine adult medical examination - flu shot today  -mammo due  -up to date with pap and colonoscopy     Encounter for screening mammogram for malignant neoplasm of breast   -     EUNICE BARB 2 treatments as a result of today.      Sanchez Bellamy MD

## 2019-11-05 RX ORDER — LEVOTHYROXINE SODIUM 0.03 MG/1
TABLET ORAL
Qty: 90 TABLET | Refills: 3 | Status: SHIPPED | OUTPATIENT
Start: 2019-11-05 | End: 2020-10-09

## 2019-11-05 NOTE — TELEPHONE ENCOUNTER
Levothyroxine Sodium 25 mcg Oral Tab     Passed Protocol    Last OV relevant to medication: 10/22/2019  Last refill date: 3/11/2019     #/refills: #90 w/ 0 refills  When pt was asked to return for OV: 6 months   Upcoming appt/reason: No future appointments
none/denies family history

## 2019-11-12 ENCOUNTER — LAB ENCOUNTER (OUTPATIENT)
Dept: LAB | Age: 49
End: 2019-11-12
Attending: INTERNAL MEDICINE
Payer: COMMERCIAL

## 2019-11-12 DIAGNOSIS — R79.89 ABNORMAL THYROID BLOOD TEST: ICD-10-CM

## 2019-11-12 DIAGNOSIS — E03.9 HYPOTHYROIDISM, UNSPECIFIED TYPE: ICD-10-CM

## 2019-11-12 DIAGNOSIS — R71.8 ELEVATED MCV: Primary | ICD-10-CM

## 2019-11-12 DIAGNOSIS — R71.8 ELEVATED MCV: ICD-10-CM

## 2019-11-12 DIAGNOSIS — Z00.00 LABORATORY EXAM ORDERED AS PART OF ROUTINE GENERAL MEDICAL EXAMINATION: ICD-10-CM

## 2019-11-12 PROCEDURE — 80061 LIPID PANEL: CPT | Performed by: INTERNAL MEDICINE

## 2019-11-12 PROCEDURE — 84439 ASSAY OF FREE THYROXINE: CPT | Performed by: INTERNAL MEDICINE

## 2019-11-12 PROCEDURE — 80050 GENERAL HEALTH PANEL: CPT | Performed by: INTERNAL MEDICINE

## 2019-11-12 PROCEDURE — 36415 COLL VENOUS BLD VENIPUNCTURE: CPT | Performed by: INTERNAL MEDICINE

## 2019-11-12 PROCEDURE — 86376 MICROSOMAL ANTIBODY EACH: CPT | Performed by: INTERNAL MEDICINE

## 2019-11-12 PROCEDURE — 82607 VITAMIN B-12: CPT | Performed by: INTERNAL MEDICINE

## 2020-03-16 ENCOUNTER — HOSPITAL ENCOUNTER (EMERGENCY)
Age: 50
Discharge: HOME | End: 2020-03-16
Attending: EMERGENCY MEDICINE
Payer: COMMERCIAL

## 2020-03-16 VITALS
HEIGHT: 64 IN | OXYGEN SATURATION: 99 % | SYSTOLIC BLOOD PRESSURE: 161 MMHG | DIASTOLIC BLOOD PRESSURE: 74 MMHG | RESPIRATION RATE: 16 BRPM | BODY MASS INDEX: 27.49 KG/M2 | TEMPERATURE: 98 F | WEIGHT: 161 LBS | HEART RATE: 87 BPM

## 2020-03-16 DIAGNOSIS — T78.40XA ALLERGIC REACTION, INITIAL ENCOUNTER: Primary | ICD-10-CM

## 2020-03-16 PROCEDURE — 99283 EMERGENCY DEPT VISIT LOW MDM: CPT

## 2020-03-16 RX ORDER — HYDROCODONE BITARTRATE AND ACETAMINOPHEN 5; 325 MG/1; MG/1
1-2 TABLET ORAL EVERY 6 HOURS PRN
Qty: 10 TABLET | Refills: 0 | Status: SHIPPED | OUTPATIENT
Start: 2020-03-16 | End: 2020-03-23

## 2020-03-16 RX ORDER — LIDOCAINE HYDROCHLORIDE 20 MG/ML
5 JELLY TOPICAL ONCE
Status: COMPLETED | OUTPATIENT
Start: 2020-03-16 | End: 2020-03-16

## 2020-03-16 RX ORDER — METHYLPREDNISOLONE 4 MG/1
TABLET ORAL
Qty: 1 PACKAGE | Refills: 0 | Status: SHIPPED | OUTPATIENT
Start: 2020-03-16 | End: 2021-02-02 | Stop reason: ALTCHOICE

## 2020-03-16 RX ORDER — LIDOCAINE HYDROCHLORIDE 20 MG/ML
JELLY TOPICAL
Status: DISCONTINUED
Start: 2020-03-16 | End: 2020-03-16

## 2020-03-16 RX ORDER — DIPHENHYDRAMINE HCL 25 MG
25 CAPSULE ORAL ONCE
Status: COMPLETED | OUTPATIENT
Start: 2020-03-16 | End: 2020-03-16

## 2020-03-16 RX ORDER — PREDNISONE 20 MG/1
60 TABLET ORAL ONCE
Status: COMPLETED | OUTPATIENT
Start: 2020-03-16 | End: 2020-03-16

## 2020-03-16 RX ORDER — HYDROCODONE BITARTRATE AND ACETAMINOPHEN 5; 325 MG/1; MG/1
1 TABLET ORAL ONCE
Status: COMPLETED | OUTPATIENT
Start: 2020-03-16 | End: 2020-03-16

## 2020-03-16 RX ORDER — FAMOTIDINE 20 MG/1
20 TABLET ORAL ONCE
Status: COMPLETED | OUTPATIENT
Start: 2020-03-16 | End: 2020-03-16

## 2020-03-16 NOTE — ED PROVIDER NOTES
Patient Seen in: [facility] In [place]      History   Patient presents with: Allergic Rxn Allergies    Stated Complaint: allergic reaction- rash to face    HPI  51-year-old woman coming for evaluation for facial swelling.   Patient st LMP 08/09/2016   SpO2 98%   BMI 27.64 kg/m²         Physical Exam  Vitals signs and nursing note reviewed. Constitutional:       General: She is not in acute distress. Appearance: She is well-developed. She is not toxic-appearing.    HENT:      Head: diagnosis)    Disposition:  Discharge  3/16/2020  3:56 am    Follow-up:  Lindsay Carrasquillo MD  [address] [place] [address]  [address] [facility] [phone]    Schedule an appointment as soon as possible for a visit  Return to the ER if you feel worse in a

## 2020-03-16 NOTE — ED INITIAL ASSESSMENT (HPI)
53 y/o female to ED with c/o of allergic reaction. Patient reports she had a facial done on Tuesday, staff used cream that patient is allergic to, patient did not have an immediate reaction at that time.  Reports pain and redness to face has worsened, jong

## 2020-06-18 ENCOUNTER — TELEMEDICINE (OUTPATIENT)
Dept: INTERNAL MEDICINE CLINIC | Facility: CLINIC | Age: 50
End: 2020-06-18

## 2020-06-18 DIAGNOSIS — I10 ESSENTIAL HYPERTENSION: Primary | ICD-10-CM

## 2020-06-18 DIAGNOSIS — Z12.31 ENCOUNTER FOR SCREENING MAMMOGRAM FOR BREAST CANCER: ICD-10-CM

## 2020-06-18 DIAGNOSIS — Z00.00 LABORATORY EXAM ORDERED AS PART OF ROUTINE GENERAL MEDICAL EXAMINATION: ICD-10-CM

## 2020-06-18 PROCEDURE — 99213 OFFICE O/P EST LOW 20 MIN: CPT | Performed by: INTERNAL MEDICINE

## 2020-06-18 RX ORDER — LOSARTAN POTASSIUM 25 MG/1
25 TABLET ORAL DAILY
Qty: 90 TABLET | Refills: 1 | Status: SHIPPED | OUTPATIENT
Start: 2020-06-18 | End: 2020-10-21

## 2020-06-18 NOTE — PROGRESS NOTES
[facility] Internal Medicine Office Note  Chief Complaint:   No chief complaint on file.       HPI:   This is a 48year old female coming in for  HPI    She notes she had an allergic reaction after a facial and one of them had hyaluranic acid whic by mouth daily. REVIEW OF SYSTEMS:   Review of Systems     EXAM:   [facility] 08/09/2016  Estimated body mass index is 27.64 kg/m² as calculated from the following:    Height as of 3/16/20: 64\". Weight as of 3/16/20: 161 lb (73 kg).    Vital signs re

## 2020-06-22 NOTE — PROGRESS NOTES
This is a telemedicine visit with live, interactive video and audio. Patient understands and accepts financial responsibility for any deductible, co-insurance and/or co-pays associated with this service.     SUBJECTIVE  Blood pressure has been elevated capsule 1   • Cyclobenzaprine HCl 10 MG Oral Tab Take by mouth. • magnesium 250 MG Oral Tab Take 1 tablet by mouth daily. • Cholecalciferol (VITAMIN D) 2000 units Oral Cap Take 1 capsule by mouth daily.          OBJECTIVE  Physical Exam:   [place]

## 2020-10-06 ENCOUNTER — HOSPITAL ENCOUNTER (OUTPATIENT)
Dept: MAMMOGRAPHY | Age: 50
Discharge: HOME | End: 2020-10-06
Attending: INTERNAL MEDICINE
Payer: COMMERCIAL

## 2020-10-06 ENCOUNTER — LAB ENCOUNTER (OUTPATIENT)
Dept: LAB | Age: 50
End: 2020-10-06
Attending: INTERNAL MEDICINE
Payer: COMMERCIAL

## 2020-10-06 ENCOUNTER — HOSPITAL ENCOUNTER (OUTPATIENT)
Dept: CT IMAGING | Age: 50
Discharge: HOME | End: 2020-10-06
Attending: INTERNAL MEDICINE
Payer: COMMERCIAL

## 2020-10-06 DIAGNOSIS — Z12.31 ENCOUNTER FOR SCREENING MAMMOGRAM FOR BREAST CANCER: ICD-10-CM

## 2020-10-06 DIAGNOSIS — Z13.6 ENCOUNTER FOR SCREENING FOR CARDIOVASCULAR DISORDERS: ICD-10-CM

## 2020-10-06 DIAGNOSIS — Z00.00 LABORATORY EXAM ORDERED AS PART OF ROUTINE GENERAL MEDICAL EXAMINATION: ICD-10-CM

## 2020-10-06 PROCEDURE — 36415 COLL VENOUS BLD VENIPUNCTURE: CPT | Performed by: INTERNAL MEDICINE

## 2020-10-06 PROCEDURE — 77067 SCR MAMMO BI INCL CAD: CPT | Performed by: INTERNAL MEDICINE

## 2020-10-06 PROCEDURE — 77063 BREAST TOMOSYNTHESIS BI: CPT | Performed by: INTERNAL MEDICINE

## 2020-10-06 PROCEDURE — 80061 LIPID PANEL: CPT | Performed by: INTERNAL MEDICINE

## 2020-10-06 PROCEDURE — 80050 GENERAL HEALTH PANEL: CPT | Performed by: INTERNAL MEDICINE

## 2020-10-09 RX ORDER — LEVOTHYROXINE SODIUM 0.03 MG/1
TABLET ORAL
Qty: 90 TABLET | Refills: 3 | Status: SHIPPED | OUTPATIENT
Start: 2020-10-09 | End: 2021-09-02

## 2020-10-09 NOTE — TELEPHONE ENCOUNTER
Medication(s) to Refill:   Requested Prescriptions     Pending Prescriptions Disp Refills   • LEVOTHYROXINE SODIUM 25 MCG Oral Tab [Pharmacy Med Name: LEVOTHYROXINE  25MCG  TAB] 90 tablet 3     Sig: TAKE 1 TABLET BY MOUTH 1  HOUR PRIOR TO BREAKFAST AND OTH

## 2020-10-20 NOTE — TELEPHONE ENCOUNTER
Medication(s) to Refill:   Requested Prescriptions     Pending Prescriptions Disp Refills   • LOSARTAN POTASSIUM 25 MG Oral Tab [Pharmacy Med Name: LOSARTAN  25MG  TAB] 90 tablet 3     Sig: TAKE 1 TABLET BY MOUTH  DAILY       LOV: 6/18/2020   RTC: none not

## 2020-10-21 RX ORDER — LOSARTAN POTASSIUM 25 MG/1
TABLET ORAL
Qty: 90 TABLET | Refills: 0 | Status: SHIPPED | OUTPATIENT
Start: 2020-10-21 | End: 2021-01-11

## 2021-01-11 RX ORDER — LOSARTAN POTASSIUM 25 MG/1
TABLET ORAL
Qty: 90 TABLET | Refills: 3 | Status: SHIPPED | OUTPATIENT
Start: 2021-01-11 | End: 2021-02-02

## 2021-02-02 ENCOUNTER — HOSPITAL ENCOUNTER (OUTPATIENT)
Dept: GENERAL RADIOLOGY | Age: 51
Discharge: HOME | End: 2021-02-02
Attending: INTERNAL MEDICINE
Payer: COMMERCIAL

## 2021-02-02 ENCOUNTER — OFFICE VISIT (OUTPATIENT)
Dept: INTERNAL MEDICINE CLINIC | Facility: CLINIC | Age: 51
End: 2021-02-02
Payer: COMMERCIAL

## 2021-02-02 VITALS
TEMPERATURE: 98 F | BODY MASS INDEX: 29.65 KG/M2 | HEART RATE: 80 BPM | WEIGHT: 175.81 LBS | RESPIRATION RATE: 16 BRPM | SYSTOLIC BLOOD PRESSURE: 144 MMHG | HEIGHT: 64.5 IN | DIASTOLIC BLOOD PRESSURE: 94 MMHG
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
## 2021-02-02 DIAGNOSIS — M25.511 ACUTE PAIN OF RIGHT SHOULDER: ICD-10-CM

## 2021-02-02 DIAGNOSIS — Z00.00 ENCOUNTER FOR ROUTINE ADULT MEDICAL EXAMINATION: Primary | ICD-10-CM

## 2021-02-02 DIAGNOSIS — I10 ESSENTIAL HYPERTENSION: ICD-10-CM

## 2021-02-02 DIAGNOSIS — E03.9 HYPOTHYROIDISM, UNSPECIFIED TYPE: ICD-10-CM

## 2021-02-02 DIAGNOSIS — R07.81 RIB PAIN ON RIGHT SIDE: ICD-10-CM

## 2021-02-02 DIAGNOSIS — F41.9 ANXIETY: ICD-10-CM

## 2021-02-02 PROCEDURE — 3077F SYST BP >= 140 MM HG: CPT | Performed by: INTERNAL MEDICINE

## 2021-02-02 PROCEDURE — 99396 PREV VISIT EST AGE 40-64: CPT | Performed by: INTERNAL MEDICINE

## 2021-02-02 PROCEDURE — 3008F BODY MASS INDEX DOCD: CPT | Performed by: INTERNAL MEDICINE

## 2021-02-02 PROCEDURE — 71101 X-RAY EXAM UNILAT RIBS/CHEST: CPT | Performed by: INTERNAL MEDICINE

## 2021-02-02 PROCEDURE — 73030 X-RAY EXAM OF SHOULDER: CPT | Performed by: INTERNAL MEDICINE

## 2021-02-02 PROCEDURE — 3080F DIAST BP >= 90 MM HG: CPT | Performed by: INTERNAL MEDICINE

## 2021-02-02 RX ORDER — LOSARTAN POTASSIUM 50 MG/1
50 TABLET ORAL DAILY
Qty: 90 TABLET | Refills: 1 | Status: SHIPPED | OUTPATIENT
Start: 2021-02-02 | End: 2021-06-15

## 2021-02-02 RX ORDER — ESOMEPRAZOLE MAGNESIUM 10 MG/1
GRANULE, FOR SUSPENSION, EXTENDED RELEASE ORAL
COMMUNITY
Start: 2020-06-12 | End: 2021-02-02

## 2021-02-02 RX ORDER — FLUOXETINE HYDROCHLORIDE 20 MG/1
20 CAPSULE ORAL DAILY
Qty: 90 CAPSULE | Refills: 3 | Status: SHIPPED | OUTPATIENT
Start: 2021-02-02 | End: 2021-12-03

## 2021-02-02 NOTE — PROGRESS NOTES
[facility] Internal Medicine Office Note  Chief Complaint:   Patient presents with:  Physical: pt is fasting       HPI:   This is a 48year old female coming in for physical   HPI  Blood pressure high - on losartan 25mg   Checking at home ranges losartan Potassium 50 MG Oral Tab Take 1 tablet (50 mg total) by mouth daily. 90 tablet 1   • FLUoxetine HCl 20 MG Oral Cap Take 1 capsule (20 mg total) by mouth daily.  90 capsule 3   • LEVOTHYROXINE SODIUM 25 MCG Oral Tab TAKE 1 TABLET BY MOUTH 1  HOUR HI has no cervical adenopathy. Neurological: She is alert. Skin: Skin is warm and dry. Psychiatric: She has a normal mood and affect.        ASSESSMENT AND PLAN:   Deana Barahona is a 48year old female with  Encounter for routine adult medical examinat VIEWS), RIGHT (CPT=73030)  XR RIBS, UNILATERAL (2 VIEWS), RIGHT (CPT=71100)    Pneumococcal Vaccine: Birth to 64yrs(1 of 1 - PPSV23) due on 05/31/1976  Tobacco Cessation Counseling 2 Years due on 05/31/1982  Zoster Vaccines(1 of 2) due on 05/31/2020  Influ

## 2021-02-02 NOTE — PATIENT INSTRUCTIONS
X-rays today     Increase losartan to 50mg daily (can take two of the 25mg tablets together)    Follow up in 4-6 weeks for blood pressure check    Follow up with Dr. Buddy Pena for pap smear

## 2021-06-15 RX ORDER — LOSARTAN POTASSIUM 50 MG/1
TABLET ORAL
Qty: 90 TABLET | Refills: 3 | Status: SHIPPED | OUTPATIENT
Start: 2021-06-15

## 2021-06-15 NOTE — TELEPHONE ENCOUNTER
Protocol passed  Medication(s) to Refill:   Requested Prescriptions     Pending Prescriptions Disp Refills   • LOSARTAN POTASSIUM 50 MG Oral Tab [Pharmacy Med Name: LOSARTAN  50MG  TAB] 90 tablet 3     Sig: TAKE 1 TABLET BY MOUTH  DAILY       LOV: 2/2/21

## 2021-09-02 RX ORDER — LEVOTHYROXINE SODIUM 0.03 MG/1
TABLET ORAL
Qty: 90 TABLET | Refills: 3 | Status: SHIPPED | OUTPATIENT
Start: 2021-09-02

## 2021-09-02 NOTE — TELEPHONE ENCOUNTER
LOV: 2/2/2021 with Dr. Mati Hemphill  RTC: 4-6 weeks  Last Relevant Labs: 10/6/2020   Filled: 10/9/2020   #90 with 3 refills    No future appointments.

## 2021-09-08 RX ORDER — FLUOXETINE HYDROCHLORIDE 20 MG/1
20 CAPSULE ORAL DAILY
Qty: 90 CAPSULE | Refills: 3 | Status: CANCELLED | OUTPATIENT
Start: 2021-09-08

## 2021-09-09 NOTE — TELEPHONE ENCOUNTER
Failed protocol     Last refill:  2/2/2021 Fluoxetine 20 mg #90 3R    LOV:   2/2/2021 Dr Bandar Dailey RTC 4 weeks  Anxiety - cont fluoxetine 20mg, doing well  FOV scheduled 10/1/2021 - Dr Bandar Dailey

## 2021-12-03 RX ORDER — FLUOXETINE HYDROCHLORIDE 20 MG/1
CAPSULE ORAL
Qty: 90 CAPSULE | Refills: 3 | Status: SHIPPED | OUTPATIENT
Start: 2021-12-03

## 2021-12-03 NOTE — TELEPHONE ENCOUNTER
LOV: 2/2/2021 with Dr. Yadi Lai  RTC: 4-6 weeks  Last Relevant Labs: 10/6/2020   Filled: 2/2/2021    #90 with 3 refills to [facility]    No future appointments.

## 2022-05-25 ENCOUNTER — TELEPHONE (OUTPATIENT)
Dept: INTERNAL MEDICINE CLINIC | Facility: CLINIC | Age: 52
End: 2022-05-25

## 2022-05-26 NOTE — TELEPHONE ENCOUNTER
Failed protocol     Last refill:  6/15/21 Losartan 50 mg #90 3R    LOV:   2/2/21 Dr Adrian Vitale RTC  No FOV scheduled

## 2022-05-27 RX ORDER — LOSARTAN POTASSIUM 50 MG/1
TABLET ORAL
Qty: 30 TABLET | Refills: 0 | Status: SHIPPED | OUTPATIENT
Start: 2022-05-27 | End: 2022-07-18

## 2022-05-27 NOTE — TELEPHONE ENCOUNTER
Call patient to schedule appointment.  Last seen over a year ago so only 30 day supply can be refilled

## 2022-07-18 RX ORDER — LOSARTAN POTASSIUM 50 MG/1
TABLET ORAL
Qty: 30 TABLET | Refills: 0 | Status: SHIPPED | OUTPATIENT
Start: 2022-07-18 | End: 2022-08-02

## 2022-07-18 NOTE — TELEPHONE ENCOUNTER
Afrifresh Grouphart message sent   Patient due for cpx/bp follow up     Protocol failed     Requesting: losartan 50mg   LOV:2/2/21   RTC: 4 weeks   Filled: 5/27/22 #30 0 refills   Recent Labs: 10/6/20     Upcoming OV: none scheduled

## 2022-07-19 ENCOUNTER — HOSPITAL ENCOUNTER (OUTPATIENT)
Dept: BONE DENSITY | Age: 52
Discharge: HOME | End: 2022-07-19
Attending: INTERNAL MEDICINE
Payer: COMMERCIAL

## 2022-07-19 DIAGNOSIS — Z78.0 POST-MENOPAUSAL: ICD-10-CM

## 2022-07-19 PROCEDURE — 77080 DXA BONE DENSITY AXIAL: CPT | Performed by: INTERNAL MEDICINE

## 2022-08-03 RX ORDER — LOSARTAN POTASSIUM 50 MG/1
TABLET ORAL
Qty: 90 TABLET | Refills: 0 | Status: SHIPPED | OUTPATIENT
Start: 2022-08-03

## 2022-09-03 ENCOUNTER — TELEPHONE (OUTPATIENT)
Dept: INTERNAL MEDICINE CLINIC | Facility: CLINIC | Age: 52
End: 2022-09-03

## 2022-09-06 RX ORDER — LEVOTHYROXINE SODIUM 0.03 MG/1
TABLET ORAL
Qty: 90 TABLET | Refills: 0 | Status: SHIPPED | OUTPATIENT
Start: 2022-09-06 | End: 2023-06-19

## 2022-09-06 NOTE — TELEPHONE ENCOUNTER
Pt scheduled cpx -     Future Appointments   Date Time Provider Aryan Siomara   10/19/2022 12:00 PM Jcarlos Jackson MD [facility] [place]

## 2022-10-10 RX ORDER — LOSARTAN POTASSIUM 50 MG/1
TABLET ORAL
Qty: 90 TABLET | Refills: 0 | Status: SHIPPED | OUTPATIENT
Start: 2022-10-10

## 2022-10-10 NOTE — TELEPHONE ENCOUNTER
Appt scheduled.     Future Appointments   Date Time Provider Aryan Stringer   11/4/2022 10:30 AM Authur Lanes, MD [facility] [place]

## 2022-10-10 NOTE — TELEPHONE ENCOUNTER
Detailed VM left for patient   Due for cpx and labs       Protocol failed     Requesting: losartan 50mg     LOV: 2/2/21   Essential hypertension - high today and at home - increase losartan to 50mg and f/u in 4-6 weeks for BP check   RTC: 4-6 weeks   Filled: 8/3/22 #90 0 refills   Recent Labs: 10/6/20     Upcoming OV: none scheduled

## 2022-11-03 RX ORDER — FLUOXETINE HYDROCHLORIDE 20 MG/1
CAPSULE ORAL
Qty: 90 CAPSULE | Refills: 0 | Status: SHIPPED | OUTPATIENT
Start: 2022-11-03

## 2022-11-16 ENCOUNTER — TELEPHONE (OUTPATIENT)
Dept: INTERNAL MEDICINE CLINIC | Facility: CLINIC | Age: 52
End: 2022-11-16

## 2022-11-16 RX ORDER — OSELTAMIVIR PHOSPHATE 75 MG/1
75 CAPSULE ORAL 2 TIMES DAILY
Qty: 10 CAPSULE | Refills: 0 | Status: SHIPPED | OUTPATIENT
Start: 2022-11-16

## 2022-11-16 NOTE — TELEPHONE ENCOUNTER
LS: pt called in and said she watches her grandkids all the time. One of them was diagnosed with Influenza A on Monday (11/14). Pt is leaving on a trip this Friday (11/18) by plane. She has an itchy nose and sneezing. No fever. Pt is wondering if you would prescribe Tamiflu since she is having some symptoms and she's leaving on a trip in a few days. Pt has appt set with you on 12/7. If Rx sent, she'd like it sent to :   [facility] [address] [place] [address] - [facility] [phone], [phone]    Please advise, thanks!

## 2022-11-25 RX ORDER — LEVOTHYROXINE SODIUM 0.03 MG/1
TABLET ORAL
Qty: 90 TABLET | Refills: 3 | OUTPATIENT
Start: 2022-11-25

## 2022-12-07 ENCOUNTER — OFFICE VISIT (OUTPATIENT)
Dept: INTERNAL MEDICINE CLINIC | Facility: CLINIC | Age: 52
End: 2022-12-07
Payer: COMMERCIAL

## 2022-12-07 ENCOUNTER — LAB ENCOUNTER (OUTPATIENT)
Dept: LAB | Age: 52
End: 2022-12-07
Attending: INTERNAL MEDICINE
Payer: COMMERCIAL

## 2022-12-07 VITALS
TEMPERATURE: 98 F | DIASTOLIC BLOOD PRESSURE: 80 MMHG | SYSTOLIC BLOOD PRESSURE: 130 MMHG | HEART RATE: 60 BPM | WEIGHT: 173.38 LBS | HEIGHT: 63.5 IN | OXYGEN SATURATION: 100 % | BODY MASS INDEX: 30.34 KG/M2 | RESPIRATION RATE: 18 BRPM

## 2022-12-07 DIAGNOSIS — Z00.00 LABORATORY EXAM ORDERED AS PART OF ROUTINE GENERAL MEDICAL EXAMINATION: ICD-10-CM

## 2022-12-07 DIAGNOSIS — Z23 NEED FOR IMMUNIZATION AGAINST INFLUENZA: ICD-10-CM

## 2022-12-07 DIAGNOSIS — F41.9 ANXIETY: ICD-10-CM

## 2022-12-07 DIAGNOSIS — I10 ESSENTIAL HYPERTENSION: ICD-10-CM

## 2022-12-07 DIAGNOSIS — E03.9 HYPOTHYROIDISM, UNSPECIFIED TYPE: ICD-10-CM

## 2022-12-07 DIAGNOSIS — E55.9 HYPOVITAMINOSIS D: ICD-10-CM

## 2022-12-07 DIAGNOSIS — Z00.00 ENCOUNTER FOR ROUTINE ADULT MEDICAL EXAMINATION: Primary | ICD-10-CM

## 2022-12-07 DIAGNOSIS — Z12.31 SCREENING MAMMOGRAM FOR BREAST CANCER: ICD-10-CM

## 2022-12-07 LAB
ALBUMIN SERPL-MCNC: 4 G/DL (ref 3.4–5)
ALBUMIN/GLOB SERPL: 1.1 {RATIO} (ref 1–2)
ALP LIVER SERPL-CCNC: 61 U/L
ALT SERPL-CCNC: 72 U/L
ANION GAP SERPL CALC-SCNC: 4 MMOL/L (ref 0–18)
AST SERPL-CCNC: 39 U/L (ref 15–37)
BASOPHILS # BLD AUTO: 0.03 X10(3) UL (ref 0–0.2)
BASOPHILS NFR BLD AUTO: 0.5 %
BILIRUB SERPL-MCNC: 0.2 MG/DL (ref 0.1–2)
BUN BLD-MCNC: 12 MG/DL (ref 7–18)
CALCIUM BLD-MCNC: 9.5 MG/DL (ref 8.5–10.1)
CHLORIDE SERPL-SCNC: 110 MMOL/L (ref 98–112)
CHOLEST SERPL-MCNC: 230 MG/DL (ref ?–200)
CO2 SERPL-SCNC: 27 MMOL/L (ref 21–32)
CREAT BLD-MCNC: 0.93 MG/DL
EOSINOPHIL # BLD AUTO: 0.12 X10(3) UL (ref 0–0.7)
EOSINOPHIL NFR BLD AUTO: 1.8 %
ERYTHROCYTE [DISTWIDTH] IN BLOOD BY AUTOMATED COUNT: 13 %
FASTING PATIENT LIPID ANSWER: NO
FASTING STATUS PATIENT QL REPORTED: NO
GFR SERPLBLD BASED ON 1.73 SQ M-ARVRAT: 74 ML/MIN/1.73M2 (ref 60–?)
GLOBULIN PLAS-MCNC: 3.5 G/DL (ref 2.8–4.4)
GLUCOSE BLD-MCNC: 97 MG/DL (ref 70–99)
HCT VFR BLD AUTO: 39.1 %
HDLC SERPL-MCNC: 66 MG/DL (ref 40–59)
HGB BLD-MCNC: 13 G/DL
IMM GRANULOCYTES # BLD AUTO: 0.04 X10(3) UL (ref 0–1)
IMM GRANULOCYTES NFR BLD: 0.6 %
LDLC SERPL CALC-MCNC: 138 MG/DL (ref ?–100)
LYMPHOCYTES # BLD AUTO: 1.85 X10(3) UL (ref 1–4)
LYMPHOCYTES NFR BLD AUTO: 28.5 %
MCH RBC QN AUTO: 34.2 PG (ref 26–34)
MCHC RBC AUTO-ENTMCNC: 33.2 G/DL (ref 31–37)
MCV RBC AUTO: 102.9 FL
MONOCYTES # BLD AUTO: 0.56 X10(3) UL (ref 0.1–1)
MONOCYTES NFR BLD AUTO: 8.6 %
NEUTROPHILS # BLD AUTO: 3.89 X10 (3) UL (ref 1.5–7.7)
NEUTROPHILS # BLD AUTO: 3.89 X10(3) UL (ref 1.5–7.7)
NEUTROPHILS NFR BLD AUTO: 60 %
NONHDLC SERPL-MCNC: 164 MG/DL (ref ?–130)
OSMOLALITY SERPL CALC.SUM OF ELEC: 292 MOSM/KG (ref 275–295)
PLATELET # BLD AUTO: 227 10(3)UL (ref 150–450)
POTASSIUM SERPL-SCNC: 4.3 MMOL/L (ref 3.5–5.1)
PROT SERPL-MCNC: 7.5 G/DL (ref 6.4–8.2)
RBC # BLD AUTO: 3.8 X10(6)UL
SODIUM SERPL-SCNC: 141 MMOL/L (ref 136–145)
TRIGL SERPL-MCNC: 146 MG/DL (ref 30–149)
TSI SER-ACNC: 1.55 MIU/ML (ref 0.36–3.74)
VIT D+METAB SERPL-MCNC: 36.3 NG/ML (ref 30–100)
VLDLC SERPL CALC-MCNC: 27 MG/DL (ref 0–30)
WBC # BLD AUTO: 6.5 X10(3) UL (ref 4–11)

## 2022-12-07 PROCEDURE — 3075F SYST BP GE 130 - 139MM HG: CPT | Performed by: INTERNAL MEDICINE

## 2022-12-07 PROCEDURE — 80061 LIPID PANEL: CPT | Performed by: INTERNAL MEDICINE

## 2022-12-07 PROCEDURE — 3008F BODY MASS INDEX DOCD: CPT | Performed by: INTERNAL MEDICINE

## 2022-12-07 PROCEDURE — 3079F DIAST BP 80-89 MM HG: CPT | Performed by: INTERNAL MEDICINE

## 2022-12-07 PROCEDURE — 90471 IMMUNIZATION ADMIN: CPT | Performed by: INTERNAL MEDICINE

## 2022-12-07 PROCEDURE — 99396 PREV VISIT EST AGE 40-64: CPT | Performed by: INTERNAL MEDICINE

## 2022-12-07 PROCEDURE — 80050 GENERAL HEALTH PANEL: CPT | Performed by: INTERNAL MEDICINE

## 2022-12-07 PROCEDURE — 90686 IIV4 VACC NO PRSV 0.5 ML IM: CPT | Performed by: INTERNAL MEDICINE

## 2022-12-07 PROCEDURE — 82306 VITAMIN D 25 HYDROXY: CPT | Performed by: INTERNAL MEDICINE

## 2022-12-07 RX ORDER — FLUOXETINE 10 MG/1
10 CAPSULE ORAL DAILY
Qty: 90 CAPSULE | Refills: 3 | Status: SHIPPED | OUTPATIENT
Start: 2022-12-07

## 2022-12-07 RX ORDER — PSEUDOEPHEDRINE HCL 120 MG
400 TABLET, EXTENDED RELEASE ORAL DAILY
COMMUNITY

## 2022-12-07 RX ORDER — LORATADINE 10 MG/1
10 TABLET ORAL DAILY
COMMUNITY

## 2022-12-09 DIAGNOSIS — R74.8 ELEVATED LIVER ENZYMES: Primary | ICD-10-CM

## 2022-12-29 RX ORDER — LOSARTAN POTASSIUM 50 MG/1
TABLET ORAL
Qty: 90 TABLET | Refills: 3 | Status: SHIPPED | OUTPATIENT
Start: 2022-12-29

## 2022-12-29 NOTE — TELEPHONE ENCOUNTER
Protocol passed     Requesting: losartan 50mg     LOV:12/7/22   RTC: none noted   Filled: 10/10/22 # 90 0 refills   Recent Labs: 12/7/22     Upcoming OV: none scheduled

## 2023-01-25 ENCOUNTER — PATIENT MESSAGE (OUTPATIENT)
Dept: INTERNAL MEDICINE CLINIC | Facility: CLINIC | Age: 53
End: 2023-01-25

## 2023-01-25 DIAGNOSIS — R74.8 ELEVATED LIVER ENZYMES: Primary | ICD-10-CM

## 2023-01-25 RX ORDER — FLUOXETINE HYDROCHLORIDE 20 MG/1
20 CAPSULE ORAL DAILY
Qty: 90 CAPSULE | Refills: 1 | Status: SHIPPED | OUTPATIENT
Start: 2023-01-25

## 2023-01-25 NOTE — TELEPHONE ENCOUNTER
From: Pillo Godinez  To: Jyoti Oneill MD  Sent: 1/25/2023 7:06 AM CST  Subject: Do I need further action? Dr. Harish Nunez,    I retested blood work for my liver enzymes during my [facility] appointment w Dr. Madelaine Hagen. It was 30 days after the original test. Levels are even higher. She suggested I follow up w you. Please review results and advise. I tried the lower dose of fluoxetine (10mg) for three weeks. This was not a good idea. I experienced mood change, anxiety and constant feeling of unease. I started back on the 20 mg. Please change that prescription.      Thank you,   Lul Ely

## 2023-02-14 ENCOUNTER — HOSPITAL ENCOUNTER (OUTPATIENT)
Dept: ULTRASOUND IMAGING | Age: 53
Discharge: HOME | End: 2023-02-14
Attending: INTERNAL MEDICINE
Payer: COMMERCIAL

## 2023-02-14 ENCOUNTER — LAB ENCOUNTER (OUTPATIENT)
Dept: LAB | Age: 53
End: 2023-02-14
Attending: INTERNAL MEDICINE
Payer: COMMERCIAL

## 2023-02-14 ENCOUNTER — HOSPITAL ENCOUNTER (OUTPATIENT)
Dept: MAMMOGRAPHY | Age: 53
Discharge: HOME | End: 2023-02-14
Attending: INTERNAL MEDICINE
Payer: COMMERCIAL

## 2023-02-14 DIAGNOSIS — R74.8 ELEVATED LIVER ENZYMES: ICD-10-CM

## 2023-02-14 DIAGNOSIS — Z12.31 SCREENING MAMMOGRAM FOR BREAST CANCER: ICD-10-CM

## 2023-02-14 LAB
ALBUMIN SERPL-MCNC: 4.2 G/DL (ref 3.4–5)
ALP LIVER SERPL-CCNC: 72 U/L
ALT SERPL-CCNC: 69 U/L
AST SERPL-CCNC: 36 U/L (ref 15–37)
BILIRUB DIRECT SERPL-MCNC: <0.1 MG/DL (ref 0–0.2)
BILIRUB SERPL-MCNC: 0.4 MG/DL (ref 0.1–2)
DEPRECATED HBV CORE AB SER IA-ACNC: 258.4 NG/ML
HBV CORE AB SERPL QL IA: NONREACTIVE
HBV SURFACE AB SER QL: NONREACTIVE
HBV SURFACE AB SERPL IA-ACNC: 6.59 MIU/ML
HBV SURFACE AG SER-ACNC: <0.1 [IU]/L
HBV SURFACE AG SERPL QL IA: NONREACTIVE
HCV AB SERPL QL IA: NONREACTIVE
IRON SATN MFR SERPL: 30 %
IRON SERPL-MCNC: 144 UG/DL
PROT SERPL-MCNC: 8.2 G/DL (ref 6.4–8.2)
TIBC SERPL-MCNC: 475 UG/DL (ref 240–450)
TRANSFERRIN SERPL-MCNC: 319 MG/DL (ref 200–360)

## 2023-02-14 PROCEDURE — 82728 ASSAY OF FERRITIN: CPT

## 2023-02-14 PROCEDURE — 77063 BREAST TOMOSYNTHESIS BI: CPT | Performed by: INTERNAL MEDICINE

## 2023-02-14 PROCEDURE — 87340 HEPATITIS B SURFACE AG IA: CPT

## 2023-02-14 PROCEDURE — 86803 HEPATITIS C AB TEST: CPT

## 2023-02-14 PROCEDURE — 77067 SCR MAMMO BI INCL CAD: CPT | Performed by: INTERNAL MEDICINE

## 2023-02-14 PROCEDURE — 36415 COLL VENOUS BLD VENIPUNCTURE: CPT

## 2023-02-14 PROCEDURE — 83550 IRON BINDING TEST: CPT

## 2023-02-14 PROCEDURE — 76700 US EXAM ABDOM COMPLETE: CPT | Performed by: INTERNAL MEDICINE

## 2023-02-14 PROCEDURE — 80076 HEPATIC FUNCTION PANEL: CPT

## 2023-02-14 PROCEDURE — 86706 HEP B SURFACE ANTIBODY: CPT

## 2023-02-14 PROCEDURE — 86704 HEP B CORE ANTIBODY TOTAL: CPT

## 2023-02-14 PROCEDURE — 83540 ASSAY OF IRON: CPT

## 2023-06-19 RX ORDER — FLUOXETINE HYDROCHLORIDE 20 MG/1
CAPSULE ORAL
Qty: 90 CAPSULE | Refills: 3 | OUTPATIENT
Start: 2023-06-19

## 2023-06-19 RX ORDER — LEVOTHYROXINE SODIUM 0.03 MG/1
TABLET ORAL
Qty: 90 TABLET | Refills: 0 | Status: SHIPPED | OUTPATIENT
Start: 2023-06-19

## 2023-07-18 ENCOUNTER — PATIENT MESSAGE (OUTPATIENT)
Dept: INTERNAL MEDICINE CLINIC | Facility: CLINIC | Age: 53
End: 2023-07-18

## 2023-07-19 NOTE — TELEPHONE ENCOUNTER
LS - LOV 12/7/22 should pt schedule in-person appt? OK to use 15-minute open spot? Please advise, ty!

## 2023-07-26 ENCOUNTER — OFFICE VISIT (OUTPATIENT)
Dept: INTERNAL MEDICINE CLINIC | Facility: CLINIC | Age: 53
End: 2023-07-26
Payer: COMMERCIAL

## 2023-07-26 VITALS
BODY MASS INDEX: 30.13 KG/M2 | DIASTOLIC BLOOD PRESSURE: 80 MMHG | HEART RATE: 67 BPM | OXYGEN SATURATION: 98 % | WEIGHT: 172.19 LBS | TEMPERATURE: 98 F | HEIGHT: 63.5 IN | RESPIRATION RATE: 16 BRPM | SYSTOLIC BLOOD PRESSURE: 106 MMHG

## 2023-07-26 DIAGNOSIS — M54.2 NECK PAIN: ICD-10-CM

## 2023-07-26 DIAGNOSIS — R22.42 MASS OF LEFT FOOT: Primary | ICD-10-CM

## 2023-07-26 DIAGNOSIS — I10 ESSENTIAL HYPERTENSION: ICD-10-CM

## 2023-07-26 DIAGNOSIS — E03.9 HYPOTHYROIDISM, UNSPECIFIED TYPE: ICD-10-CM

## 2023-07-26 DIAGNOSIS — R74.8 ELEVATED LIVER ENZYMES: ICD-10-CM

## 2023-07-26 PROCEDURE — 3079F DIAST BP 80-89 MM HG: CPT | Performed by: INTERNAL MEDICINE

## 2023-07-26 PROCEDURE — 3008F BODY MASS INDEX DOCD: CPT | Performed by: INTERNAL MEDICINE

## 2023-07-26 PROCEDURE — 3074F SYST BP LT 130 MM HG: CPT | Performed by: INTERNAL MEDICINE

## 2023-07-26 PROCEDURE — 99214 OFFICE O/P EST MOD 30 MIN: CPT | Performed by: INTERNAL MEDICINE

## 2023-07-26 NOTE — PATIENT INSTRUCTIONS
X-ray of foot    MRI cervical spine - check with your insurance that it has been approved prior to exam     Call to schedule appointment with podiatry specialist       Follow up with gynecology for pap smear  Gynecology:   Cheryl Lan, Dr. Kayley Lopez, Dr. Silvino Loya [phone]    Dr. Nguyen Newberry, Dr. Vega Began group in [place] [phone]

## 2023-08-04 RX ORDER — FLUOXETINE HYDROCHLORIDE 20 MG/1
20 CAPSULE ORAL DAILY
Qty: 90 CAPSULE | Refills: 3 | Status: SHIPPED | OUTPATIENT
Start: 2023-08-04

## 2023-08-04 NOTE — TELEPHONE ENCOUNTER
Requested Prescriptions     Pending Prescriptions Disp Refills    FLUOXETINE 20 MG Oral Cap [Pharmacy Med Name: FLUoxetine HCl 20 MG Oral Capsule] 90 capsule 3     Sig: TAKE 1 CAPSULE BY MOUTH DAILY     LOV 7/26/23  RTC 4 months   Filled 1/25/23 90 caps 1 refill  No future appointments.

## 2023-08-25 RX ORDER — LEVOTHYROXINE SODIUM 0.03 MG/1
TABLET ORAL
Qty: 90 TABLET | Refills: 3 | Status: SHIPPED | OUTPATIENT
Start: 2023-08-25

## 2023-08-25 NOTE — TELEPHONE ENCOUNTER
Name from pharmacy: Levothyroxine Sodium 25 MCG Oral Tablet         Will file in chart as: LEVOTHYROXINE 25 MCG Oral Tab    Sig: TAKE 1 TABLET BY MOUTH 1  HOUR PRIOR TO BREAKFAST AND OTHER MEDICATIONS    Original sig: TAKE 1 TABLET BY MOUTH 1 HOUR  PRIOR TO BREAKFAST AND OTHER  MEDICATIONS    Disp: 90 tablet    Refills: 3    Start: 8/25/2023    Class: Normal    Non-formulary    To pharmacy: Please send a replace/new response with 90-Day Supply if appropriate to maximize member benefit. Requesting 1 year supply.     Last ordered: 2 months ago by Scotty Holloway MD Last refill: 6/19/2023    Rx #: [id number]    Thyroid Supplements Protocol Kbvind1208/25/2023 04:52 AM   Protocol Details TSH test in past 12 months    TSH value between 0.350 and 5.500 IU/ml    Appointment in past 12 or next 3 months      To be filled at: [facility] ([facility]) - [place] Tee [phone], [phone]

## 2023-09-06 ENCOUNTER — HOSPITAL ENCOUNTER (OUTPATIENT)
Dept: GENERAL RADIOLOGY | Age: 53
Discharge: HOME | End: 2023-09-06
Attending: INTERNAL MEDICINE
Payer: COMMERCIAL

## 2023-09-06 ENCOUNTER — HOSPITAL ENCOUNTER (OUTPATIENT)
Dept: MRI IMAGING | Age: 53
Discharge: HOME | End: 2023-09-06
Attending: INTERNAL MEDICINE
Payer: COMMERCIAL

## 2023-09-06 DIAGNOSIS — M54.2 NECK PAIN: ICD-10-CM

## 2023-09-06 DIAGNOSIS — R22.42 MASS OF LEFT FOOT: ICD-10-CM

## 2023-09-06 PROCEDURE — 73630 X-RAY EXAM OF FOOT: CPT | Performed by: INTERNAL MEDICINE

## 2023-09-06 PROCEDURE — 72141 MRI NECK SPINE W/O DYE: CPT | Performed by: INTERNAL MEDICINE

## 2023-12-01 RX ORDER — LOSARTAN POTASSIUM 50 MG/1
TABLET ORAL
Qty: 90 TABLET | Refills: 3 | Status: SHIPPED | OUTPATIENT
Start: 2023-12-01

## 2023-12-01 NOTE — TELEPHONE ENCOUNTER
Passed protocol     Last refill:  LOSARTAN 50 MG Oral Tab 90 tablet 3 12/29/2022    Sig:   TAKE 1 TABLET BY MOUTH  DAILY         LOV:  7/26/2023 Dr Adrian Vitale RTC 4 months  No FOV scheduled

## 2024-08-14 ENCOUNTER — TELEPHONE (OUTPATIENT)
Dept: INTERNAL MEDICINE CLINIC | Facility: CLINIC | Age: 54
End: 2024-08-14

## 2024-08-14 DIAGNOSIS — Z12.31 SCREENING MAMMOGRAM FOR BREAST CANCER: Primary | ICD-10-CM

## 2024-08-15 RX ORDER — LEVOTHYROXINE SODIUM 25 UG/1
TABLET ORAL
Qty: 90 TABLET | Refills: 0 | Status: SHIPPED | OUTPATIENT
Start: 2024-08-15 | End: 2024-10-10

## 2024-08-15 NOTE — TELEPHONE ENCOUNTER
Apt scheduled. Pt will schedule apt on mychart for mammo    Future Appointments   Date Time Provider Department Center   11/6/2024 11:00 AM Wanda Artis MD [facility] [place]

## 2024-08-15 NOTE — TELEPHONE ENCOUNTER
Call to schedule appointment for physical    Please let her know she is also due for mammogram and an order was placed

## 2024-09-19 NOTE — TELEPHONE ENCOUNTER
FW to  FYI
From: Kyle Vogel  To: Nate Atkins MD  Sent: 11/29/2018 1:16 PM CST  Subject: Test Results Question    Dr. Arnoldo Bill,   At my appt in Oct you prescribed a low dose of medicine for my thyroid.  I believe we discussed that I should have follow up blood wo
Please review
Yes

## 2024-10-04 RX ORDER — LOSARTAN POTASSIUM 50 MG/1
TABLET ORAL
Qty: 90 TABLET | Refills: 3 | OUTPATIENT
Start: 2024-10-04

## 2024-10-04 NOTE — TELEPHONE ENCOUNTER
Losartan Potassium 50 MG Oral Tablet          Will file in chart as: LOSARTAN 50 MG Oral Tab    Sig: TAKE 1 TABLET BY MOUTH DAILY    Disp: 90 tablet    Refills: 3    Start: 10/3/2024    Class: Normal    Non-formulary    To pharmacy: Please send a replace/new response with 90-Day Supply if appropriate to maximize member benefit. Requesting 1 year supply.    Last ordered: 10 months ago (12/1/2023) by Wanda Artis MD    Last refill: 8/9/2024    Rx #: [id number]    Hypertension Medications Protocol Kugzmz42/03/2024 09:03 PM   Protocol Details CMP or BMP in past 12 months    EGFRCR or GFRNAA > 50    Last BP reading less than 140/90    In person appointment or virtual visit in the past 12 mos or appointment in next 3 mos      LOV 7/26/23  RTC 4 months  Filled 12/1/23 90 tabs 3 refills   One year supply on file   Last labs 2023  Future Appointments   Date Time Provider Department Center   11/6/2024 11:00 AM Wanda Artis MD [facility] [place]

## 2024-10-10 RX ORDER — LEVOTHYROXINE SODIUM 25 UG/1
TABLET ORAL
Qty: 90 TABLET | Refills: 0 | Status: SHIPPED | OUTPATIENT
Start: 2024-10-10

## 2024-10-10 NOTE — TELEPHONE ENCOUNTER
Protocol failed     LOV: 7/26/23   RTC: 4 months  Filled: 8/15/24 #90   Labs: 12/7/22  Future Appointments   Date Time Provider Department Center   11/6/2024 11:00 AM Wanda Artis MD [facility] [place]

## 2024-10-10 NOTE — TELEPHONE ENCOUNTER
FLUoxetine HCl 20 MG Oral Capsule          Will file in chart as: FLUOXETINE 20 MG Oral Cap    Sig: TAKE 1 CAPSULE BY MOUTH DAILY    Disp: 90 capsule    Refills: 3    Start: 10/10/2024    Class: Normal    To pharmacy: Please send a replace/new response with 90-Day Supply if appropriate to maximize member benefit. Requesting 1 year supply.    Last ordered: 1 month ago (8/15/2024) by Wanda Artis MD    Last refill: 8/15/2024    Rx #: [id number]    Psychiatric Non-Scheduled (Anti-Anxiety) Wpthny10/10/2024 12:05 AM   Protocol Details Depression Screening completed within the past 12 months    In person appointment or virtual visit in the past 6 mos or appointment in next 3 mos      LOV 7/26/23  RTC 4 months  Filled 8/15/24 90 tabs 0 refills   Future Appointments   Date Time Provider Department Center   11/6/2024 11:00 AM Wanda Artis MD [facility] [place]

## 2024-11-06 ENCOUNTER — HOSPITAL ENCOUNTER (OUTPATIENT)
Dept: MAMMOGRAPHY | Age: 54
Discharge: HOME | End: 2024-11-06
Attending: INTERNAL MEDICINE
Payer: COMMERCIAL

## 2024-11-06 ENCOUNTER — OFFICE VISIT (OUTPATIENT)
Dept: INTERNAL MEDICINE CLINIC | Facility: CLINIC | Age: 54
End: 2024-11-06
Payer: COMMERCIAL

## 2024-11-06 ENCOUNTER — LAB ENCOUNTER (OUTPATIENT)
Dept: LAB | Age: 54
End: 2024-11-06
Attending: INTERNAL MEDICINE
Payer: COMMERCIAL

## 2024-11-06 VITALS
WEIGHT: 172.38 LBS | SYSTOLIC BLOOD PRESSURE: 110 MMHG | BODY MASS INDEX: 29.43 KG/M2 | DIASTOLIC BLOOD PRESSURE: 76 MMHG | OXYGEN SATURATION: 97 % | HEIGHT: 64 IN | RESPIRATION RATE: 16 BRPM | TEMPERATURE: 98 F | HEART RATE: 79 BPM

## 2024-11-06 DIAGNOSIS — M54.50 CHRONIC LEFT-SIDED LOW BACK PAIN WITHOUT SCIATICA: ICD-10-CM

## 2024-11-06 DIAGNOSIS — E03.9 HYPOTHYROIDISM, UNSPECIFIED TYPE: ICD-10-CM

## 2024-11-06 DIAGNOSIS — Z23 NEED FOR IMMUNIZATION AGAINST INFLUENZA: ICD-10-CM

## 2024-11-06 DIAGNOSIS — G89.29 CHRONIC LEFT-SIDED LOW BACK PAIN WITHOUT SCIATICA: ICD-10-CM

## 2024-11-06 DIAGNOSIS — I10 ESSENTIAL HYPERTENSION: ICD-10-CM

## 2024-11-06 DIAGNOSIS — E55.9 HYPOVITAMINOSIS D: ICD-10-CM

## 2024-11-06 DIAGNOSIS — F41.9 ANXIETY: ICD-10-CM

## 2024-11-06 DIAGNOSIS — Z00.00 LABORATORY EXAM ORDERED AS PART OF ROUTINE GENERAL MEDICAL EXAMINATION: ICD-10-CM

## 2024-11-06 DIAGNOSIS — Z00.00 ENCOUNTER FOR ROUTINE ADULT MEDICAL EXAMINATION: Primary | ICD-10-CM

## 2024-11-06 DIAGNOSIS — Z12.31 SCREENING MAMMOGRAM FOR BREAST CANCER: ICD-10-CM

## 2024-11-06 DIAGNOSIS — R74.8 ELEVATED LIVER ENZYMES: ICD-10-CM

## 2024-11-06 LAB
ALBUMIN SERPL-MCNC: 4.6 G/DL (ref 3.2–4.8)
ALBUMIN/GLOB SERPL: 1.4 {RATIO} (ref 1–2)
ALP LIVER SERPL-CCNC: 65 U/L
ALT SERPL-CCNC: 71 U/L
ANION GAP SERPL CALC-SCNC: 5 MMOL/L (ref 0–18)
AST SERPL-CCNC: 54 U/L (ref ?–34)
BASOPHILS # BLD AUTO: 0.05 X10(3) UL (ref 0–0.2)
BASOPHILS NFR BLD AUTO: 0.7 %
BILIRUB SERPL-MCNC: 0.4 MG/DL (ref 0.3–1.2)
BUN BLD-MCNC: 12 MG/DL (ref 9–23)
CALCIUM BLD-MCNC: 10.3 MG/DL (ref 8.7–10.4)
CHLORIDE SERPL-SCNC: 105 MMOL/L (ref 98–112)
CHOLEST SERPL-MCNC: 232 MG/DL (ref ?–200)
CO2 SERPL-SCNC: 28 MMOL/L (ref 21–32)
CREAT BLD-MCNC: 0.86 MG/DL
EGFRCR SERPLBLD CKD-EPI 2021: 80 ML/MIN/1.73M2 (ref 60–?)
EOSINOPHIL # BLD AUTO: 0.2 X10(3) UL (ref 0–0.7)
EOSINOPHIL NFR BLD AUTO: 2.9 %
ERYTHROCYTE [DISTWIDTH] IN BLOOD BY AUTOMATED COUNT: 13 %
FASTING PATIENT LIPID ANSWER: NO
FASTING STATUS PATIENT QL REPORTED: NO
GLOBULIN PLAS-MCNC: 3.3 G/DL (ref 2–3.5)
GLUCOSE BLD-MCNC: 89 MG/DL (ref 70–99)
HCT VFR BLD AUTO: 41.3 %
HDLC SERPL-MCNC: 70 MG/DL (ref 40–59)
HGB BLD-MCNC: 13.6 G/DL
IMM GRANULOCYTES # BLD AUTO: 0.02 X10(3) UL (ref 0–1)
IMM GRANULOCYTES NFR BLD: 0.3 %
LDLC SERPL CALC-MCNC: 140 MG/DL (ref ?–100)
LYMPHOCYTES # BLD AUTO: 1.36 X10(3) UL (ref 1–4)
LYMPHOCYTES NFR BLD AUTO: 19.8 %
MCH RBC QN AUTO: 33.2 PG (ref 26–34)
MCHC RBC AUTO-ENTMCNC: 32.9 G/DL (ref 31–37)
MCV RBC AUTO: 100.7 FL
MONOCYTES # BLD AUTO: 0.49 X10(3) UL (ref 0.1–1)
MONOCYTES NFR BLD AUTO: 7.1 %
NEUTROPHILS # BLD AUTO: 4.76 X10 (3) UL (ref 1.5–7.7)
NEUTROPHILS # BLD AUTO: 4.76 X10(3) UL (ref 1.5–7.7)
NEUTROPHILS NFR BLD AUTO: 69.2 %
NONHDLC SERPL-MCNC: 162 MG/DL (ref ?–130)
OSMOLALITY SERPL CALC.SUM OF ELEC: 285 MOSM/KG (ref 275–295)
PLATELET # BLD AUTO: 241 10(3)UL (ref 150–450)
POTASSIUM SERPL-SCNC: 4.6 MMOL/L (ref 3.5–5.1)
PROT SERPL-MCNC: 7.9 G/DL (ref 5.7–8.2)
RBC # BLD AUTO: 4.1 X10(6)UL
SODIUM SERPL-SCNC: 138 MMOL/L (ref 136–145)
TRIGL SERPL-MCNC: 124 MG/DL (ref 30–149)
TSI SER-ACNC: 2.42 UIU/ML (ref 0.55–4.78)
VIT D+METAB SERPL-MCNC: 36.2 NG/ML (ref 30–100)
VLDLC SERPL CALC-MCNC: 23 MG/DL (ref 0–30)
WBC # BLD AUTO: 6.9 X10(3) UL (ref 4–11)

## 2024-11-06 PROCEDURE — 90471 IMMUNIZATION ADMIN: CPT | Performed by: INTERNAL MEDICINE

## 2024-11-06 PROCEDURE — 85025 COMPLETE CBC W/AUTO DIFF WBC: CPT

## 2024-11-06 PROCEDURE — 90656 IIV3 VACC NO PRSV 0.5 ML IM: CPT | Performed by: INTERNAL MEDICINE

## 2024-11-06 PROCEDURE — 84443 ASSAY THYROID STIM HORMONE: CPT

## 2024-11-06 PROCEDURE — 36415 COLL VENOUS BLD VENIPUNCTURE: CPT

## 2024-11-06 PROCEDURE — 82306 VITAMIN D 25 HYDROXY: CPT

## 2024-11-06 PROCEDURE — 3008F BODY MASS INDEX DOCD: CPT | Performed by: INTERNAL MEDICINE

## 2024-11-06 PROCEDURE — 3074F SYST BP LT 130 MM HG: CPT | Performed by: INTERNAL MEDICINE

## 2024-11-06 PROCEDURE — 80053 COMPREHEN METABOLIC PANEL: CPT

## 2024-11-06 PROCEDURE — 99396 PREV VISIT EST AGE 40-64: CPT | Performed by: INTERNAL MEDICINE

## 2024-11-06 PROCEDURE — 77067 SCR MAMMO BI INCL CAD: CPT | Performed by: INTERNAL MEDICINE

## 2024-11-06 PROCEDURE — 80061 LIPID PANEL: CPT

## 2024-11-06 PROCEDURE — 3078F DIAST BP <80 MM HG: CPT | Performed by: INTERNAL MEDICINE

## 2024-11-06 PROCEDURE — 77063 BREAST TOMOSYNTHESIS BI: CPT | Performed by: INTERNAL MEDICINE

## 2024-11-06 RX ORDER — CYCLOBENZAPRINE HCL 10 MG
10 TABLET ORAL NIGHTLY
COMMUNITY
Start: 2024-10-23

## 2024-11-06 NOTE — PATIENT INSTRUCTIONS
Gynecology:   [facility] - Dr. Palomino, Dr. Blue, Dr. Meeks [phone]    Dr. Kathleen Ford, Dr. Nancy Catherine - [facility] in [facility] [phone]         Blood work

## 2024-11-06 NOTE — PROGRESS NOTES
[facility] Internal Medicine Office Note  Chief Complaint:   Chief Complaint   Patient presents with    Physical       HPI:   This is a 54 year old female coming in for physical  HPI    Left lower back pain     She feels scratchy throat   Postnasal drip   She relates to the weather change    She notes a lot of stress related to her mom having dementia and having to be moved to assisted living    She has fibromyalgia that has flared with the stress    HTN- losartan 50mg     Hypothyroidism - on levothyroxine         Patient Active Problem List   Diagnosis    Left arm pain    Acute deep vein thrombosis (DVT) of ulnar vein of right upper extremity (HCC)    Ovarian cyst    Anxiety     Past Surgical History:   Procedure Laterality Date          Colonoscopy       Family History   Problem Relation Age of Onset    Thyroid Disorder Mother     Dementia Mother     Other (non-hodgkin lymphoma) Mother     Cancer Mother         Lymphoma-removed  w surgery 2nd diagnosis 2024    Other (HTN) Father     Cancer Father         Lung cancer    Hypertension Father     Clotting Disorder Paternal Grandmother          of blood clot after orthopedic surgery, was elderly    Clotting Disorder Paternal Aunt         DVT    Other (lung cancer) Paternal Aunt     Other (gallbladder) Paternal Aunt     Dementia Maternal Grandmother         I reviewed her's Past Medical History, Past Surgical History, Family History and   Social History updated shows  Social History     Socioeconomic History    Marital status:    Tobacco Use    Smoking status: Former     Current packs/day: 1.00     Types: Cigarettes    Smokeless tobacco: Current   Vaping Use    Vaping status: Some Days    Substances: Flavoring    Devices: Disposable   Substance and Sexual Activity    Alcohol use: Yes     Comment: once a week    Drug use: No    Sexual activity: Yes     Partners: Male   Other Topics Concern    Caffeine Concern Yes     Comment: less  than a cup a day    Exercise No    Seat Belt Yes     Allergies:  Allergies[1]  Current Outpatient Medications   Medication Sig Dispense Refill    cyclobenzaprine 10 MG Oral Tab Take 1 tablet (10 mg total) by mouth nightly.      levothyroxine 25 MCG Oral Tab TAKE 1 TABLET BY MOUTH 1 HOUR  PRIOR TO BREAKFAST AND OTHER  MEDICATIONS 90 tablet 0    FLUoxetine 20 MG Oral Cap Take 1 capsule (20 mg total) by mouth daily. 90 capsule 0    LOSARTAN 50 MG Oral Tab TAKE 1 TABLET BY MOUTH DAILY 90 tablet 3    Calcium Carbonate-Vitamin D (CALCIUM 600 + D OR) Take 2 tablets by mouth daily.      loratadine 10 MG Oral Tab Take 1 tablet (10 mg total) by mouth daily.           REVIEW OF SYSTEMS:   Review of Systems   Constitutional:  Negative for fever.   HENT:  Negative for congestion.    Eyes:  Negative for visual disturbance.   Respiratory:  Negative for shortness of breath.    Cardiovascular:  Negative for chest pain.   Gastrointestinal:  Negative for constipation.   Genitourinary:  Negative for dysuria.   Neurological: Negative.    Hematological: Negative.    Psychiatric/Behavioral: Negative.          EXAM:   /76   Pulse 79   Temp 97.7 °F (36.5 °C) (Temporal)   Resp 16   Ht 5' 4\" (1.626 m)   Wt 172 lb 6.4 oz (78.2 kg)   LMP 08/01/2016   SpO2 97%   BMI 29.59 kg/m²  Estimated body mass index is 29.59 kg/m² as calculated from the following:    Height as of this encounter: 5' 4\" (1.626 m).    Weight as of this encounter: 172 lb 6.4 oz (78.2 kg).   Vital signs reviewed. Appears stated age, well groomed.  Physical Exam  Vitals reviewed.   Constitutional:       General: She is not in acute distress.     Appearance: She is well-developed.   HENT:      Head: Normocephalic and atraumatic.   Eyes:      Conjunctiva/sclera: Conjunctivae normal.   Cardiovascular:      Rate and Rhythm: Normal rate and regular rhythm.      Heart sounds: Normal heart sounds.   Pulmonary:      Effort: Pulmonary effort is normal.      Breath sounds:  Normal breath sounds.   Musculoskeletal:      Cervical back: Neck supple.   Skin:     General: Skin is warm and dry.   Neurological:      General: No focal deficit present.      Mental Status: She is alert.   Psychiatric:         Mood and Affect: Mood normal.          ASSESSMENT AND PLAN:   Gabby Meeks is a 54 year old female with  1. Encounter for routine adult medical examination    2. Elevated liver enzymes    3. Essential hypertension    4. Hypothyroidism, unspecified type    5. Hypovitaminosis D    6. Anxiety    7. Laboratory exam ordered as part of routine general medical examination    8. Chronic left-sided low back pain without sciatica          The plan is as follows  Gabby was seen today for physical.    Diagnoses and all orders for this visit:    Encounter for routine adult medical examination  -flu shot today  -Tdap recommended since due in 6/2025 and there has been pertussis in the community     Elevated liver enzymes - fatty liver seen on US previously. Plan to get liver US with elastography if enzymes continue to be elevated. Previously neg hepatitis panel and iron testing.   -she did not have immunity to hep B on testing and Twinrix vaccine previously recommended.     Essential hypertension -at goal; cont present management     Hypothyroidism, unspecified type -check TSH; cont levothyroxine     Hypovitaminosis D -low in past; recheck  -     Vitamin D [E]; Future    Anxiety-doing well with fluoxetine 20mg daily     Laboratory exam ordered as part of routine general medical examination  -     Lipid Panel; Future  -     TSH W Reflex To Free T4; Future  -     CBC With Differential With Platelet; Future  -     Comp Metabolic Panel (14); Future    Chronic left-sided low back pain without sciatica- she notes pain had been bilateral and now just on left side. She plans to let me know if pain is persisting and will order x-ray at that time.       Orders Placed This Encounter   Procedures    Lipid Panel     TSH W Reflex To Free T4    CBC With Differential With Platelet    Comp Metabolic Panel (14)    Vitamin D [E]       Meds & Refills for this Visit:  Requested Prescriptions      No prescriptions requested or ordered in this encounter       Imaging & Consults:  None    Health Maintenance Due   Topic Date Due    Pneumococcal Vaccine: Birth to 64yrs (1 of 2 - PCV) Never done    Zoster Vaccines (1 of 2) Never done    Pap Smear  10/09/2020    Annual Physical  12/07/2023    Annual Depression Screening  01/01/2024    Tobacco Cessation Counseling  Never done    Mammogram  02/14/2024    COVID-19 Vaccine (1 - 2023-24 season) Never done     Patient/Caregiver Education: Patient/Caregiver Education: There are no barriers to learning. Medical education done. Outcome: Patient verbalizes understanding. Patient is notified to call with any questions, complications, allergies, or worsening or changing symptoms.  Patient is to call with any side effects or complications from the treatments as a result of today.     Wanda Artis MD       [1]   Allergies  Allergen Reactions    Hyaluronic Acid ANXIETY, FACE FLUSHING, HIVES, ITCHING, PAIN, RASH and SWELLING    Nickel RASH    Biaxin [Clarithromycin]      Has tolerated Zpack    Doxycycline     Flagyl [Metronidazole]     Macrobid [Nitrofurantoin]     Sulfa Drugs Cross Reactors

## 2024-11-07 DIAGNOSIS — R74.8 ELEVATED LIVER ENZYMES: Primary | ICD-10-CM

## 2024-12-05 NOTE — TELEPHONE ENCOUNTER
Requesting    Name from pharmacy: FLUoxetine HCl 20 MG Oral Capsule         Will file in chart as: FLUOXETINE 20 MG Oral Cap    Sig: TAKE 1 CAPSULE BY MOUTH DAILY    Disp: 90 capsule    Refills: 3    Start: 12/4/2024    Class: Normal    Non-formulary    To pharmacy: Please send a replace/new response with 90-Day Supply if appropriate to maximize member benefit. Requesting 1 year supply.    Last ordered: 1 month ago (10/10/2024) by Macie Solorzano MD    Last refill: 10/10/2024    Rx #: [id number]    Psychiatric Non-Scheduled (Anti-Anxiety) Eeyfaw4412/04/2024 09:02 PM   Protocol Details In person appointment or virtual visit in the past 6 mos or appointment in next 3 mos    Depression Screening completed within the past 12 months      To be filled at: [address] [phone], [phone]        LOV: 11/06/24 w/ MAIKEL  RTC: No Follow Up on File   Last Relevant Labs: 11/06/24  No future appointments.

## 2024-12-09 RX ORDER — LEVOTHYROXINE SODIUM 25 UG/1
TABLET ORAL
Qty: 90 TABLET | Refills: 3 | Status: SHIPPED | OUTPATIENT
Start: 2024-12-09

## 2024-12-09 NOTE — TELEPHONE ENCOUNTER
Requesting    Name from pharmacy: Levothyroxine Sodium 25 MCG Oral Tablet         Will file in chart as: LEVOTHYROXINE 25 MCG Oral Tab    Sig: TAKE 1 TABLET BY MOUTH 1 HOUR  PRIOR TO BREAKFAST AND OTHER  MEDICATIONS    Disp: 90 tablet    Refills: 3    Start: 12/5/2024    Class: Normal    To pharmacy: Please send a replace/new response with 90-Day Supply if appropriate to maximize member benefit. Requesting 1 year supply.    Last ordered: 2 months ago (10/10/2024) by Macie Solorzano MD    Last refill: 10/11/2024    Rx #: [id number]    Thyroid Medication Protocol Jstebl5812/05/2024 09:37 PM   Protocol Details TSH in past 12 months    Last TSH value is normal    In person appointment or virtual visit in the past 12 mos or appointment in next 3 mos      To be filled at: [address] [phone], [phone]     LOV: 11/06/24 w/ MAIKEL  RTC: No Follow Up on File   Last Relevant Labs: 11/06/24  No future appointments.

## 2025-06-05 NOTE — TELEPHONE ENCOUNTER
Protocol failed    LOV: 11/6/24   RTC: none noted  Filled: 12/1/23 #90 3 refill   Labs: 11/6/24   No future appointments.

## 2025-06-06 RX ORDER — LOSARTAN POTASSIUM 50 MG/1
50 TABLET ORAL DAILY
Qty: 90 TABLET | Refills: 3 | Status: SHIPPED | OUTPATIENT
Start: 2025-06-06

## 2025-07-16 ENCOUNTER — HOSPITAL ENCOUNTER (OUTPATIENT)
Dept: ULTRASOUND IMAGING | Age: 55
Discharge: HOME | End: 2025-07-16
Attending: INTERNAL MEDICINE
Payer: COMMERCIAL

## 2025-07-16 DIAGNOSIS — R74.8 ELEVATED LIVER ENZYMES: ICD-10-CM

## 2025-07-16 PROCEDURE — 76981 USE PARENCHYMA: CPT | Performed by: INTERNAL MEDICINE

## 2025-07-16 PROCEDURE — 76705 ECHO EXAM OF ABDOMEN: CPT | Performed by: INTERNAL MEDICINE

## 2025-07-30 RX ORDER — LOSARTAN POTASSIUM 50 MG/1
50 TABLET ORAL DAILY
Qty: 90 TABLET | Refills: 1 | Status: SHIPPED | OUTPATIENT
Start: 2025-07-30

## 2025-07-30 RX ORDER — LEVOTHYROXINE SODIUM 25 UG/1
TABLET ORAL
Qty: 90 TABLET | Refills: 1 | Status: SHIPPED | OUTPATIENT
Start: 2025-07-30

## (undated) NOTE — ED AVS SNAPSHOT
Lu Costello   MRN: [id number]    Department:  THE [facility] Emergency Department in [place]   Date of Visit:  12/24/2017           Disclosure     Insurance plans vary and the physician(s) referred by the ER may not be covered by your plan.  Please conta tell this physician (or your personal doctor if your instructions are to return to your personal doctor) about any new or lasting problems. The primary care or specialist physician will see patients referred from the [facility] Emergency Department.  Rain Kwon

## (undated) NOTE — ED AVS SNAPSHOT
Jenni Cole   MRN: [id number]    Department:  [facility] Emergency Department in [place]   Date of Visit:  3/16/2020           Disclosure     Insurance plans vary and the physician(s) referred by the ER may not be covered by your plan.  Please contac tell this physician (or your personal doctor if your instructions are to return to your personal doctor) about any new or lasting problems. The primary care or specialist physician will see patients referred from the [facility] Emergency Department.  Lesley Mackay

## (undated) NOTE — LETTER
[address]  [place], [facility] [address]  [address] [facility] [id number] [phone]          2019    Re: Erika IZAGUIRRE 1970    To Whom It May Concern:     Ms. Priscilla Marie is a patient of mine and requests a lette

## (undated) NOTE — MR AVS SNAPSHOT
[address]  [address] [place] [id number] [phone]               Thank you for choosing us for your health care visit with Monica Avina PA-C.   We are glad to serve you and happy to provide you with this east ounces of fresh, warm water. Use a bulb syringe to gently squirt the water into your nose a few times a day. You can also buy ready-made saline nasal sprays.   Apply hot or cold packs  Applying heat to the area surrounding your sinuses may make you feel mor · As much as possible, stay away from infected people. · Follow these standbys for staying healthy: Eat balanced meals, exercise regularly, and get plenty of sleep. Stay away from allergens  First find out what things you’re allergic to.  Then take steps · Try a sip of water first thing after waking up. · Keep your throat moist by drinking 6 or more glasses of clear liquids every day. · Run a cool-air humidifier in your room overnight.   · Avoid cigarette smoke.   · Suck on throat lozenges, cough drops, h © 0401-6135 [address], [address] [place] [address]. All rights reserved. This information is not intended as a substitute for professional medical care. Always follow your healthcare professional's instructions.              Al office, you can view your past visit information in [employer] by going to Visits < Visit Summaries. [employer] questions? Call [phone] for help. [employer] is NOT to be used for urgent needs. For medical emergencies, dial 911.            Visit [facility]

## (undated) NOTE — LETTER
07/31/20        [address] [place] [address]      Dear Lelia Postal records indicate that you have outstanding lab work and or testing that was ordered for you and has not yet been completed: Mammogram + Fasting lab work - (

## (undated) NOTE — LETTER
11/21/19        [address] [place] [address]      Dear Isabel Lopez records indicate that you have outstanding lab work and or testing that was ordered for you and has not yet been completed: Mammogram - - Please contact Samina